# Patient Record
Sex: FEMALE | Race: WHITE | NOT HISPANIC OR LATINO | Employment: STUDENT | URBAN - METROPOLITAN AREA
[De-identification: names, ages, dates, MRNs, and addresses within clinical notes are randomized per-mention and may not be internally consistent; named-entity substitution may affect disease eponyms.]

---

## 2017-08-20 ENCOUNTER — OFFICE VISIT (OUTPATIENT)
Dept: URGENT CARE | Facility: CLINIC | Age: 22
End: 2017-08-20
Payer: COMMERCIAL

## 2017-08-20 VITALS
BODY MASS INDEX: 25.11 KG/M2 | OXYGEN SATURATION: 100 % | HEART RATE: 95 BPM | HEIGHT: 67 IN | WEIGHT: 160 LBS | DIASTOLIC BLOOD PRESSURE: 80 MMHG | TEMPERATURE: 98 F | SYSTOLIC BLOOD PRESSURE: 121 MMHG | RESPIRATION RATE: 18 BRPM

## 2017-08-20 DIAGNOSIS — J02.9 SORE THROAT: Primary | ICD-10-CM

## 2017-08-20 DIAGNOSIS — J02.9 ACUTE PHARYNGITIS, UNSPECIFIED ETIOLOGY: ICD-10-CM

## 2017-08-20 LAB
CTP QC/QA: YES
S PYO RRNA THROAT QL PROBE: NEGATIVE

## 2017-08-20 PROCEDURE — 3008F BODY MASS INDEX DOCD: CPT | Mod: S$GLB,,, | Performed by: EMERGENCY MEDICINE

## 2017-08-20 PROCEDURE — 99214 OFFICE O/P EST MOD 30 MIN: CPT | Mod: 25,S$GLB,, | Performed by: EMERGENCY MEDICINE

## 2017-08-20 PROCEDURE — 87880 STREP A ASSAY W/OPTIC: CPT | Mod: QW,S$GLB,, | Performed by: EMERGENCY MEDICINE

## 2017-08-20 PROCEDURE — 96372 THER/PROPH/DIAG INJ SC/IM: CPT | Mod: S$GLB,,, | Performed by: EMERGENCY MEDICINE

## 2017-08-20 RX ORDER — AZITHROMYCIN 250 MG/1
TABLET, FILM COATED ORAL
Qty: 2 TABLET | Refills: 0 | Status: SHIPPED | OUTPATIENT
Start: 2017-08-20 | End: 2018-08-01 | Stop reason: ALTCHOICE

## 2017-08-20 RX ORDER — BETAMETHASONE SODIUM PHOSPHATE AND BETAMETHASONE ACETATE 3; 3 MG/ML; MG/ML
9 INJECTION, SUSPENSION INTRA-ARTICULAR; INTRALESIONAL; INTRAMUSCULAR; SOFT TISSUE ONCE
Status: COMPLETED | OUTPATIENT
Start: 2017-08-20 | End: 2017-08-20

## 2017-08-20 RX ADMIN — BETAMETHASONE SODIUM PHOSPHATE AND BETAMETHASONE ACETATE 9 MG: 3; 3 INJECTION, SUSPENSION INTRA-ARTICULAR; INTRALESIONAL; INTRAMUSCULAR; SOFT TISSUE at 02:08

## 2017-08-20 NOTE — PATIENT INSTRUCTIONS
Please return here or go to the Emergency Department for any concerns or worsening of condition.     Follow up with Primary Care or ENT if not improved in 7-10 Days:  [phone]      Pharyngitis: Strep (Presumed)     You have pharyngitis (sore throat). The cause is thought to be the streptococcus, or strep, bacterium. Strep throat infection can cause throat pain that is worse when swallowing, aching all over, headache, and fever. The infection may be spread by coughing, kissing, or touching others after touching your mouth or nose. Antibiotic medications are given to treat the infection.  Home care  · Rest at home. Drink plenty of fluids to avoid dehydration.  · No work or school for the first 2 days of taking the antibiotics. After this time, you will not be contagious. You can then return to work or school if you are feeling better.   · The antibiotic medication must be taken for the full 10 days, even if you feel better. This is very important to ensure the infection is treated. It is also important to prevent drug-resistant organisms from developing. If you were given an antibiotic shot, no more antibiotics are needed.  · You may use acetaminophen or ibuprofen to control pain or fever, unless another medicine was prescribed for this. If you have chronic liver or kidney disease or ever had a stomach ulcer or GI bleeding, talk with your doctor before using these medicines.  · Throat lozenges or a throat-numbing sprays can help reduce throat pain. Gargling with warm salt water can also help. Dissolve 1/2 teaspoon of salt in 1 8 ounce glass of warm water.   · Avoid salty or spicy foods, which can irritate the throat.  Follow-up care  Follow up with your healthcare provider or our staff if you are not improving over the next week.  When to seek medical advice  Call your healthcare provider right away if any of these occur:  · Fever as directed by your doctor.   · New or worsening ear pain, sinus pain, or  headache  · Painful lumps in the back of neck  · Stiff neck  · Lymph nodes are getting larger  · Inability to swallow liquids, excessive drooling, or inability to open mouth wide due to throat pain  · Signs of dehydration (very dark urine or no urine, sunken eyes, dizziness)  · Trouble breathing or noisy breathing  · Muffled voice  · New rash  Date Last Reviewed: 4/13/2015  © 9887-6353 [employer]. [address] [place] [address], [place] [address]. All rights reserved. This information is not intended as a substitute for professional medical care. Always follow your healthcare professional's instructions.

## 2018-08-01 ENCOUNTER — LAB VISIT (OUTPATIENT)
Dept: LAB | Facility: HOSPITAL | Age: 23
End: 2018-08-01
Attending: INTERNAL MEDICINE
Payer: COMMERCIAL

## 2018-08-01 ENCOUNTER — OFFICE VISIT (OUTPATIENT)
Dept: INTERNAL MEDICINE | Facility: CLINIC | Age: 23
End: 2018-08-01
Payer: COMMERCIAL

## 2018-08-01 VITALS
RESPIRATION RATE: 18 BRPM | DIASTOLIC BLOOD PRESSURE: 72 MMHG | SYSTOLIC BLOOD PRESSURE: 106 MMHG | BODY MASS INDEX: 25.15 KG/M2 | OXYGEN SATURATION: 99 % | HEIGHT: 67 IN | HEART RATE: 82 BPM | TEMPERATURE: 98 F | WEIGHT: 160.25 LBS

## 2018-08-01 DIAGNOSIS — B00.9 HSV INFECTION: ICD-10-CM

## 2018-08-01 DIAGNOSIS — Z23 NEED FOR HPV VACCINATION: ICD-10-CM

## 2018-08-01 DIAGNOSIS — Z00.00 ANNUAL PHYSICAL EXAM: Primary | ICD-10-CM

## 2018-08-01 DIAGNOSIS — Z23 NEED FOR TD VACCINE: ICD-10-CM

## 2018-08-01 DIAGNOSIS — Z00.00 ANNUAL PHYSICAL EXAM: ICD-10-CM

## 2018-08-01 LAB
ALBUMIN SERPL BCP-MCNC: 4.5 G/DL
ALP SERPL-CCNC: 57 U/L
ALT SERPL W/O P-5'-P-CCNC: 17 U/L
ANION GAP SERPL CALC-SCNC: 9 MMOL/L
AST SERPL-CCNC: 20 U/L
BASOPHILS # BLD AUTO: 0.03 K/UL
BASOPHILS NFR BLD: 0.6 %
BILIRUB SERPL-MCNC: 0.6 MG/DL
BUN SERPL-MCNC: 13 MG/DL
CALCIUM SERPL-MCNC: 9.8 MG/DL
CHLORIDE SERPL-SCNC: 102 MMOL/L
CHOLEST SERPL-MCNC: 165 MG/DL
CHOLEST/HDLC SERPL: 2.9 {RATIO}
CO2 SERPL-SCNC: 25 MMOL/L
CREAT SERPL-MCNC: 0.9 MG/DL
DIFFERENTIAL METHOD: NORMAL
EOSINOPHIL # BLD AUTO: 0.1 K/UL
EOSINOPHIL NFR BLD: 1.8 %
ERYTHROCYTE [DISTWIDTH] IN BLOOD BY AUTOMATED COUNT: 12 %
EST. GFR  (AFRICAN AMERICAN): >60 ML/MIN/1.73 M^2
EST. GFR  (NON AFRICAN AMERICAN): >60 ML/MIN/1.73 M^2
ESTIMATED AVG GLUCOSE: 88 MG/DL
GLUCOSE SERPL-MCNC: 82 MG/DL
HBA1C MFR BLD HPLC: 4.7 %
HCT VFR BLD AUTO: 44.7 %
HDLC SERPL-MCNC: 56 MG/DL
HDLC SERPL: 33.9 %
HGB BLD-MCNC: 14.8 G/DL
IMM GRANULOCYTES # BLD AUTO: 0.01 K/UL
IMM GRANULOCYTES NFR BLD AUTO: 0.2 %
LDLC SERPL CALC-MCNC: 95.4 MG/DL
LYMPHOCYTES # BLD AUTO: 2.2 K/UL
LYMPHOCYTES NFR BLD: 42.6 %
MCH RBC QN AUTO: 30.7 PG
MCHC RBC AUTO-ENTMCNC: 33.1 G/DL
MCV RBC AUTO: 93 FL
MONOCYTES # BLD AUTO: 0.4 K/UL
MONOCYTES NFR BLD: 7.7 %
NEUTROPHILS # BLD AUTO: 2.4 K/UL
NEUTROPHILS NFR BLD: 47.1 %
NONHDLC SERPL-MCNC: 109 MG/DL
NRBC BLD-RTO: 0 /100 WBC
PLATELET # BLD AUTO: 228 K/UL
PMV BLD AUTO: 11.4 FL
POTASSIUM SERPL-SCNC: 3.9 MMOL/L
PROT SERPL-MCNC: 7.8 G/DL
RBC # BLD AUTO: 4.82 M/UL
SODIUM SERPL-SCNC: 136 MMOL/L
TRIGL SERPL-MCNC: 68 MG/DL
TSH SERPL DL<=0.005 MIU/L-ACNC: 1.86 UIU/ML
WBC # BLD AUTO: 5.07 K/UL

## 2018-08-01 PROCEDURE — 80061 LIPID PANEL: CPT

## 2018-08-01 PROCEDURE — 83036 HEMOGLOBIN GLYCOSYLATED A1C: CPT

## 2018-08-01 PROCEDURE — 36415 COLL VENOUS BLD VENIPUNCTURE: CPT | Mod: PO

## 2018-08-01 PROCEDURE — 99385 PREV VISIT NEW AGE 18-39: CPT | Mod: 25,S$GLB,, | Performed by: INTERNAL MEDICINE

## 2018-08-01 PROCEDURE — 80053 COMPREHEN METABOLIC PANEL: CPT

## 2018-08-01 PROCEDURE — 99999 PR PBB SHADOW E&M-EST. PATIENT-LVL IV: CPT | Mod: PBBFAC,,, | Performed by: INTERNAL MEDICINE

## 2018-08-01 PROCEDURE — 90714 TD VACC NO PRESV 7 YRS+ IM: CPT | Mod: S$GLB,,, | Performed by: INTERNAL MEDICINE

## 2018-08-01 PROCEDURE — 85025 COMPLETE CBC W/AUTO DIFF WBC: CPT

## 2018-08-01 PROCEDURE — 90471 IMMUNIZATION ADMIN: CPT | Mod: S$GLB,,, | Performed by: INTERNAL MEDICINE

## 2018-08-01 PROCEDURE — 84443 ASSAY THYROID STIM HORMONE: CPT

## 2018-08-01 RX ORDER — VALACYCLOVIR HYDROCHLORIDE 500 MG/1
500 TABLET, FILM COATED ORAL 2 TIMES DAILY
Qty: 60 TABLET | Refills: 2 | Status: SHIPPED | OUTPATIENT
Start: 2018-08-01 | End: 2018-11-16 | Stop reason: SDUPTHER

## 2018-08-01 RX ORDER — VALACYCLOVIR HYDROCHLORIDE 1 G/1
1000 TABLET, FILM COATED ORAL 2 TIMES DAILY
COMMUNITY
End: 2018-08-01 | Stop reason: SDUPTHER

## 2018-08-01 NOTE — PROGRESS NOTES
Subjective:       Patient ID: Iqra Carrillo is a 23 y.o. female.    Chief Complaint: Establish Care    HPI    23 y.o. female here for annual exam.     Health Maintenance:   Lipid disorders/ASCVD risk: due    DM: A1c due  Sexual/ STD Screening: no concerns  Eye exam: no issues  Cervical Cancer (21-65y):  One year ago    Vaccines:   Influenza (yearly)    Tetanus (every 10 yrs - 1st tdap) 2008 - due    HPV (19-26 F; 19-21 M) 1/3 in 2009       Exercise: yes 2-3x/wk - walking and jogging  Diet: normal     Medical Problems:  1. ADHD: takes vyvanse 20mg daily. Follows w/ Psychiatry  2. HSV 1 and 2: she has prescription of valtrex from gynecology.       Past Medical History:   Diagnosis Date    ADHD (attention deficit hyperactivity disorder)      Past Surgical History:   Procedure Laterality Date    BREAST SURGERY       Family History   Problem Relation Age of Onset    Cancer Maternal Grandfather         kidney    No Known Problems Mother     No Known Problems Father     No Known Problems Sister     No Known Problems Brother     Asthma Neg Hx     Birth defects Neg Hx     Chromosomal disorder Neg Hx     Diabetes Neg Hx     Early death Neg Hx     Heart disease Neg Hx     Hyperlipidemia Neg Hx     Hypertension Neg Hx     Mental illness Neg Hx     Seizures Neg Hx     Thyroid disease Neg Hx     Other Neg Hx     Melanoma Neg Hx     Psoriasis Neg Hx     Lupus Neg Hx      Social History     Social History    Marital status: Single     Spouse name: N/A    Number of children: N/A    Years of education: N/A     Occupational History    Not on file.     Social History Main Topics    Smoking status: Never Smoker    Smokeless tobacco: Never Used    Alcohol use Yes    Drug use: No    Sexual activity: No     Other Topics Concern    Are You Pregnant Or Think You May Be? 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 for 5 days, Disp: 60 tablet, Rfl: 2    VYVANSE 20 mg capsule, Take 20 mg by mouth every morning., Disp: , Rfl: 0      Review of Systems   Constitutional: Negative for fatigue and unexpected weight change.   HENT: Negative for dental problem, sinus pain and trouble swallowing.    Eyes: Negative for discharge and redness.   Respiratory: Negative for cough and shortness of breath.    Cardiovascular: Negative for chest pain and leg swelling.   Gastrointestinal: Negative for abdominal pain, blood in stool, constipation and diarrhea.   Genitourinary: Negative for difficulty urinating, dysuria and frequency.   Musculoskeletal: Negative for gait problem and joint swelling.   Skin: Negative for pallor, rash and wound.   Neurological: Negative for numbness and headaches.       Objective:        Vitals:    08/01/18 0809   BP: 106/72   Pulse: 82   Resp: 18   Temp: 98.3 °F (36.8 °C)   SpO2: 99%   Weight: 72.7 kg (160 lb 4.4 oz)   Height: 5' 7" (1.702 m)       Body mass index is 25.1 kg/m².    Physical Exam   Constitutional: She is oriented to person, place, and time. She appears well-developed. No distress.   HENT:   Head: Normocephalic and atraumatic.   Right Ear: Tympanic membrane normal.   Left Ear: Tympanic membrane normal.   Nose: Nose normal.   Mouth/Throat: Oropharynx is clear and moist.   Eyes: Conjunctivae and EOM are normal. Pupils are equal, round, and reactive to light.   Neck: Normal range of motion. Neck supple. No tracheal deviation present. No thyromegaly present.   Cardiovascular: Normal rate, regular rhythm, normal heart sounds and intact distal pulses.    Pulmonary/Chest: Effort normal and breath sounds normal.   Abdominal: Soft. " Bowel sounds are normal. She exhibits no distension and no mass. There is no tenderness.   Musculoskeletal: Normal range of motion. She exhibits no edema.   Lymphadenopathy:     She has no cervical adenopathy.   Neurological: She is alert and oriented to person, place, and time. No sensory deficit.   Skin: Skin is warm and dry. She is not diaphoretic. No cyanosis. Nails show no clubbing.   Psychiatric: She has a normal mood and affect. Her behavior is normal. Judgment normal.       Assessment:     1. Annual physical exam    2. Need for Td vaccine    3. Need for HPV vaccination    4. HSV infection           Plan:         1. Annual physical exam  - CBC auto differential; Future  - Comprehensive metabolic panel; Future  - Hemoglobin A1c; Future  - Lipid panel; Future  - TSH; Future  - Urinalysis; Future    2. Need for Td vaccine  - (In Office Administered) Td Vaccine - Preservative Free    3. Need for HPV vaccination  - (In Office Administered) HPV Vaccine (9-Valent) (3 Dose) (IM)  - (In Office Administered) HPV Vaccine (9-Valent) (3 Dose) (IM)  - Ambulatory referral to Infectious Disease    4. HSV infection  - valACYclovir (VALTREX) 500 MG tablet; Take 1 tablet (500 mg total) by mouth 2 (two) times daily. for 5 days  Dispense: 60 tablet; Refill: 2

## 2018-08-02 ENCOUNTER — PATIENT MESSAGE (OUTPATIENT)
Dept: INTERNAL MEDICINE | Facility: CLINIC | Age: 23
End: 2018-08-02

## 2018-10-01 ENCOUNTER — OFFICE VISIT (OUTPATIENT)
Dept: URGENT CARE | Facility: CLINIC | Age: 23
End: 2018-10-01
Payer: COMMERCIAL

## 2018-10-01 VITALS
HEIGHT: 67 IN | TEMPERATURE: 98 F | WEIGHT: 160 LBS | SYSTOLIC BLOOD PRESSURE: 120 MMHG | OXYGEN SATURATION: 97 % | HEART RATE: 84 BPM | RESPIRATION RATE: 16 BRPM | DIASTOLIC BLOOD PRESSURE: 84 MMHG | BODY MASS INDEX: 25.11 KG/M2

## 2018-10-01 DIAGNOSIS — L30.9 DERMATITIS: Primary | ICD-10-CM

## 2018-10-01 PROCEDURE — 99214 OFFICE O/P EST MOD 30 MIN: CPT | Mod: S$GLB,,, | Performed by: PHYSICIAN ASSISTANT

## 2018-10-01 PROCEDURE — 3008F BODY MASS INDEX DOCD: CPT | Mod: CPTII,S$GLB,, | Performed by: PHYSICIAN ASSISTANT

## 2018-10-01 RX ORDER — TRIAMCINOLONE ACETONIDE 1 MG/G
CREAM TOPICAL 2 TIMES DAILY
Qty: 80 G | Refills: 0 | Status: SHIPPED | OUTPATIENT
Start: 2018-10-01 | End: 2018-10-11

## 2018-10-01 RX ORDER — VALACYCLOVIR HYDROCHLORIDE 500 MG/1
TABLET, FILM COATED ORAL
Refills: 2 | COMMUNITY
Start: 2018-09-02 | End: 2018-11-16 | Stop reason: SDUPTHER

## 2018-10-01 NOTE — PROGRESS NOTES
"Subjective:       Patient ID: Iqra Carrillo is a 23 y.o. female.    Vitals:    10/01/18 1435   BP: 120/84   Pulse: 84   Resp: 16   Temp: 97.9 °F (36.6 °C)   TempSrc: Oral   SpO2: 97%   Weight: 72.6 kg (160 lb)   Height: 5' 7" (1.702 m)       Chief Complaint: Rash    Pt has a rash on her right leg that she noticed a week ago that spread, went away, and now is back and spreading again.      Rash   This is a recurrent problem. The current episode started in the past 7 days. The problem has been waxing and waning since onset. The affected locations include the right lower leg. The rash is characterized by itchiness, redness and scaling. Associated with: a hotel room. Pertinent negatives include no cough, diarrhea, fatigue, fever, joint pain, nail changes, shortness of breath or sore throat. Past treatments include anti-itch cream. The treatment provided no relief. There is no history of allergies, asthma, eczema or varicella.     Review of Systems   Constitution: Negative for chills, fatigue and fever.   HENT: Negative for sore throat.    Respiratory: Negative for cough and shortness of breath.    Skin: Positive for color change, itching and rash. Negative for nail changes, suspicious lesions and unusual hair distribution.   Musculoskeletal: Negative for joint pain.   Gastrointestinal: Negative for diarrhea.       Objective:      Physical Exam   Constitutional: She is oriented to person, place, and time. She appears well-developed and well-nourished. She is cooperative.  Non-toxic appearance. She does not appear ill. No distress.   HENT:   Head: Normocephalic and atraumatic. Head is without abrasion, without contusion and without laceration.   Right Ear: Hearing, tympanic membrane, external ear and ear canal normal.   Left Ear: Hearing, tympanic membrane, external ear and ear canal normal.   Nose: Nose normal. No mucosal edema, rhinorrhea or nasal deformity. No epistaxis. Right sinus exhibits no maxillary sinus " tenderness and no frontal sinus tenderness. Left sinus exhibits no maxillary sinus tenderness and no frontal sinus tenderness.   Mouth/Throat: Uvula is midline, oropharynx is clear and moist and mucous membranes are normal. No trismus in the jaw. Normal dentition. No uvula swelling. No posterior oropharyngeal erythema.   Eyes: Conjunctivae, EOM and lids are normal. Pupils are equal, round, and reactive to light. No scleral icterus.   Sclera clear bilat   Neck: Trachea normal, full passive range of motion without pain and phonation normal. Neck supple.   Cardiovascular: Normal rate, regular rhythm, normal heart sounds, intact distal pulses and normal pulses.   Pulmonary/Chest: Effort normal and breath sounds normal. No stridor. No respiratory distress.   Abdominal: Soft. Normal appearance and bowel sounds are normal. She exhibits no distension. There is no tenderness.   Musculoskeletal: Normal range of motion. She exhibits no edema or deformity.   Neurological: She is alert and oriented to person, place, and time. She exhibits normal muscle tone. Coordination normal.   Skin: Skin is warm, dry and intact. Capillary refill takes less than 2 seconds. Rash noted. No abrasion, no bruising, no burn, no ecchymosis, no laceration, no lesion, no petechiae and no purpura noted. Rash is macular. Rash is not nodular, not pustular, not vesicular and not urticarial. She is not diaphoretic. No erythema. No pallor.        Nonspecific pinpoint erythematous macular rash noted without E/E/E or warmth   Psychiatric: She has a normal mood and affect. Her speech is normal and behavior is normal. Judgment and thought content normal. Cognition and memory are normal.   Nursing note and vitals reviewed.      Assessment:       1. Dermatitis        Plan:       Iqra was seen today for rash.    Diagnoses and all orders for this visit:    Dermatitis  -     triamcinolone acetonide 0.1% (KENALOG) 0.1 % cream; Apply topically 2 (two) times daily.  for 10 days      Nonspecific Dermatitis  Dermatitis is a skin rash caused by something that touches the skin and makes it irritated and inflamed.  Your skin may be red, swollen, dry, and may be cracked. Blisters may form and ooze. The rash will itch.  Dermatitis can form on the face and neck, backs of hands, forearms, genitals, and lower legs. Dermatitis is not passed from person to person.  Talk with your health care provider about what may have caused the rash. Common things that cause skin allergies are metal in jewelry, plants like poison ivy or poison oak, and certain skin care products. You will need to avoid the source of your rash in the future to prevent it from coming back. In some cases, the cause of the dermatitis may not be found.  Treatment is done to relieve itching and prevent the rash from coming back. The rash should go away in a few days to a few weeks.  Home care  The health care provider may prescribe medications to relieve swelling and itching. Follow all instructions when using these medications.  · Avoid anything that heats up your skin, such as hot showers or baths, or direct sunlight. This can make itching worse.  · Stay away from whatever you think caused the rash.  · Bathe in warm, not hot, water. Apply a moisturizing lotion after bathing to prevent dry skin.  · Avoid skin irritants such as wool or silk clothing, grease, oils, harsh soaps, and detergents.  · Apply cold compresses to soothe your sores to help relieve your symptoms. Do this for 30 minutes 3 to 4 times a day. You can make a cold compress by soaking a cloth in cold water. Squeeze out excess water. You can add colloidal oatmeal to the water to help reduce itching. For severe itching in a small area, apply an ice pack wrapped in a thin towel. Do this for 20 minutes 3 to 4 times a day.  · You can also help relieve large areas of itching by taking a lukewarm bath with colloidal oatmeal added to the water.  · Use hydrocortisone  cream for redness and irritation, unless another medicine was prescribed. You can also use benzocaine anesthetic cream or spray.  · Use oral diphenhydramine to help reduce itching. This is an antihistamine you can buy at drug and grocery stores. It can make you sleepy, so use lower doses during the daytime. Or you can use loratadine. This is an antihistamine that will not make you sleepy. Dont use diphenhydramine if you have glaucoma or have trouble urinating because of an enlarged prostate.  · Wash your hands or use an antibacterial gel often to prevent the spread of the rash.  Follow-up care  Follow up with your health care provider. Make an appointment with your health care provider if your symptoms do not get better in the next 1 to 2 weeks.  When to seek medical advice  Call your health care provider right away if any of these occur:  · Spreading of the rash to other parts of your body  · Severe swelling of your face, eyelids, mouth, throat or tongue  · Trouble urinating due to swelling in the genital area  · Fever of 100.4°F (38°C) or higher  · Redness or swelling that gets worse  · Pain that gets worse  · Foul-smelling fluid leaking from the skin  · Yellow-brown crusts on the open blisters  · Joint pain   Date Last Reviewed: 7/23/2014  © 1456-1133 [employer]. [address] [place] [address], [place] [id number]. All rights reserved. This information is not intended as a substitute for professional medical care. Always follow your healthcare professional's instructions.      Please follow up with your Primary care provider within 2-5 days if your signs and symptoms have not resolved or worsen.     If your condition worsens or fails to improve we recommend that you receive another evaluation at the emergency room immediately or contact your primary medical clinic to discuss your concerns.   You must understand that you have received an Urgent Care treatment only and that you may be released before all of  your medical problems are known or treated. You, the patient, will arrange for follow up care as instructed.     RED FLAGS/WARNING SYMPTOMS DISCUSSED WITH PATIENT THAT WOULD WARRANT EMERGENT MEDICAL ATTENTION. PATIENT VERBALIZED UNDERSTANDING.

## 2018-10-01 NOTE — PATIENT INSTRUCTIONS
Nonspecific Dermatitis  Dermatitis is a skin rash caused by something that touches the skin and makes it irritated and inflamed.  Your skin may be red, swollen, dry, and may be cracked. Blisters may form and ooze. The rash will itch.  Dermatitis can form on the face and neck, backs of hands, forearms, genitals, and lower legs. Dermatitis is not passed from person to person.  Talk with your health care provider about what may have caused the rash. Common things that cause skin allergies are metal in jewelry, plants like poison ivy or poison oak, and certain skin care products. You will need to avoid the source of your rash in the future to prevent it from coming back. In some cases, the cause of the dermatitis may not be found.  Treatment is done to relieve itching and prevent the rash from coming back. The rash should go away in a few days to a few weeks.  Home care  The health care provider may prescribe medications to relieve swelling and itching. Follow all instructions when using these medications.  · Avoid anything that heats up your skin, such as hot showers or baths, or direct sunlight. This can make itching worse.  · Stay away from whatever you think caused the rash.  · Bathe in warm, not hot, water. Apply a moisturizing lotion after bathing to prevent dry skin.  · Avoid skin irritants such as wool or silk clothing, grease, oils, harsh soaps, and detergents.  · Apply cold compresses to soothe your sores to help relieve your symptoms. Do this for 30 minutes 3 to 4 times a day. You can make a cold compress by soaking a cloth in cold water. Squeeze out excess water. You can add colloidal oatmeal to the water to help reduce itching. For severe itching in a small area, apply an ice pack wrapped in a thin towel. Do this for 20 minutes 3 to 4 times a day.  · You can also help relieve large areas of itching by taking a lukewarm bath with colloidal oatmeal added to the water.  · Use hydrocortisone cream for redness  and irritation, unless another medicine was prescribed. You can also use benzocaine anesthetic cream or spray.  · Use oral diphenhydramine to help reduce itching. This is an antihistamine you can buy at drug and grocery stores. It can make you sleepy, so use lower doses during the daytime. Or you can use loratadine. This is an antihistamine that will not make you sleepy. Dont use diphenhydramine if you have glaucoma or have trouble urinating because of an enlarged prostate.  · Wash your hands or use an antibacterial gel often to prevent the spread of the rash.  Follow-up care  Follow up with your health care provider. Make an appointment with your health care provider if your symptoms do not get better in the next 1 to 2 weeks.  When to seek medical advice  Call your health care provider right away if any of these occur:  · Spreading of the rash to other parts of your body  · Severe swelling of your face, eyelids, mouth, throat or tongue  · Trouble urinating due to swelling in the genital area  · Fever of 100.4°F (38°C) or higher  · Redness or swelling that gets worse  · Pain that gets worse  · Foul-smelling fluid leaking from the skin  · Yellow-brown crusts on the open blisters  · Joint pain   Date Last Reviewed: 7/23/2014  © 4119-6890 [employer]. [address] [place] [address], [place] [address]. All rights reserved. This information is not intended as a substitute for professional medical care. Always follow your healthcare professional's instructions.      Please follow up with your Primary care provider within 2-5 days if your signs and symptoms have not resolved or worsen.     If your condition worsens or fails to improve we recommend that you receive another evaluation at the emergency room immediately or contact your primary medical clinic to discuss your concerns.   You must understand that you have received an Urgent Care treatment only and that you may be released before all of your medical  problems are known or treated. You, the patient, will arrange for follow up care as instructed.     RED FLAGS/WARNING SYMPTOMS DISCUSSED WITH PATIENT THAT WOULD WARRANT EMERGENT MEDICAL ATTENTION. PATIENT VERBALIZED UNDERSTANDING.

## 2018-11-16 DIAGNOSIS — B00.9 HSV INFECTION: ICD-10-CM

## 2018-11-16 RX ORDER — VALACYCLOVIR HYDROCHLORIDE 500 MG/1
500 TABLET, FILM COATED ORAL 2 TIMES DAILY
Qty: 60 TABLET | Refills: 2 | Status: SHIPPED | OUTPATIENT
Start: 2018-11-16 | End: 2023-06-05 | Stop reason: SDUPTHER

## 2018-12-27 ENCOUNTER — OFFICE VISIT (OUTPATIENT)
Dept: URGENT CARE | Facility: CLINIC | Age: 23
End: 2018-12-27
Payer: COMMERCIAL

## 2018-12-27 VITALS
DIASTOLIC BLOOD PRESSURE: 82 MMHG | HEART RATE: 92 BPM | WEIGHT: 145 LBS | BODY MASS INDEX: 22.76 KG/M2 | SYSTOLIC BLOOD PRESSURE: 120 MMHG | HEIGHT: 67 IN | OXYGEN SATURATION: 99 % | RESPIRATION RATE: 18 BRPM | TEMPERATURE: 97 F

## 2018-12-27 DIAGNOSIS — J06.9 UPPER RESPIRATORY TRACT INFECTION, UNSPECIFIED TYPE: Primary | ICD-10-CM

## 2018-12-27 DIAGNOSIS — J02.9 SORE THROAT: ICD-10-CM

## 2018-12-27 LAB
CTP QC/QA: YES
S PYO RRNA THROAT QL PROBE: NEGATIVE

## 2018-12-27 PROCEDURE — 87880 STREP A ASSAY W/OPTIC: CPT | Mod: QW,S$GLB,, | Performed by: PHYSICIAN ASSISTANT

## 2018-12-27 PROCEDURE — 96372 THER/PROPH/DIAG INJ SC/IM: CPT | Mod: S$GLB,,, | Performed by: PHYSICIAN ASSISTANT

## 2018-12-27 PROCEDURE — 3008F BODY MASS INDEX DOCD: CPT | Mod: CPTII,S$GLB,, | Performed by: PHYSICIAN ASSISTANT

## 2018-12-27 PROCEDURE — 99214 OFFICE O/P EST MOD 30 MIN: CPT | Mod: 25,S$GLB,, | Performed by: PHYSICIAN ASSISTANT

## 2018-12-27 RX ORDER — BETAMETHASONE SODIUM PHOSPHATE AND BETAMETHASONE ACETATE 3; 3 MG/ML; MG/ML
6 INJECTION, SUSPENSION INTRA-ARTICULAR; INTRALESIONAL; INTRAMUSCULAR; SOFT TISSUE
Status: COMPLETED | OUTPATIENT
Start: 2018-12-27 | End: 2018-12-27

## 2018-12-27 RX ADMIN — BETAMETHASONE SODIUM PHOSPHATE AND BETAMETHASONE ACETATE 6 MG: 3; 3 INJECTION, SUSPENSION INTRA-ARTICULAR; INTRALESIONAL; INTRAMUSCULAR; SOFT TISSUE at 03:12

## 2018-12-27 NOTE — PATIENT INSTRUCTIONS
If you were prescribed a narcotic or controlled medication, do not drive or operate heavy equipment or machinery while taking these medications.  You must understand that you've received an Urgent Care treatment only and that you may be released before all your medical problems are known or treated. You, the patient, will arrange for follow up care as instructed.  Follow up with your PCP or specialty clinic as directed in the next 1-2 weeks if not improved or as needed.  You can call [phone] to schedule an appointment with the appropriate provider.  If your condition worsens we recommend that you receive another evaluation at the emergency room immediately or contact your primary medical clinics after hours call service to discuss your concerns.  Please return here or go to the Emergency Department for any concerns or worsening of condition.    Use an antihistamine such as Claritin, Zyrtec or Allegra to dry you out.     Use pseudoephedrine (behind the counter) to decongest. Pseudoephedrine  30 mg up to 240 mg /day. It can raise your blood pressure and give you palpitations.    Use mucinex (guaifenisin) to break up mucous up to 2400mg/day to loosen any mucous. The mucinex DM pill has a cough suppressant that can be used at night to stop the tickle at the back of your throat.    Use Nasal Saline to mechanically move any post nasal drip from your eustachian tube or from the back of your throat.    Use Afrin in each nare for no longer than 3 days, as it is addictive. It can also dry out your mucous membranes and cause elevated blood pressure.    Use Flonase 1-2 sprays/nostril per day. It is a local acting steroid nasal spray, if you develop a bloody nose, stop using the medication immediately.    Use warm salt water gargles to ease your throat pain. Warm salt water gargles as needed for sore throat-  1/2 tsp salt to 1 cup warm water, gargle as desired.    Sometimes Nyquil at night is beneficial to help you get  some rest, however it is sedating and it does have an antihistamine, and tylenol.    You received a steroid shot today - this can elevate your blood pressure, elevate your blood sugar, water weight gain, nervous energy, redness to the face and dimpling of the skin where the shot goes in.     Self-Care for Sore Throats    Sore throats happen for many reasons, such as colds, allergies, and infections caused by viruses or bacteria. In any case, your throat becomes red and sore. Your goal for self-care is to reduce your discomfort while giving your throat a chance to heal.  Moisten and soothe your throat  Tips include the following:  · Try a sip of water first thing after waking up.  · Keep your throat moist by drinking 6 or more glasses of clear liquids every day.  · Run a cool-air humidifier in your room overnight.  · Avoid cigarette smoke.   · Suck on throat lozenges, cough drops, hard candy, ice chips, or frozen fruit-juice bars. Use the sugar-free versions if your diet or medical condition requires them.  Gargle to ease irritation  Gargling every hour or 2 can ease irritation. Try gargling with 1 of these solutions:  · 1/4 teaspoon of salt in 1/2 cup of warm water  · An over-the-counter anesthetic gargle  Use medicine for more relief  Over-the-counter medicine can reduce sore throat symptoms. Ask your pharmacist if you have questions about which medicine to use:  · Ease pain with anesthetic sprays. Aspirin or an aspirin substitute also helps. Remember, never give aspirin to anyone 18 or younger, or if you are already taking blood thinners.   · For sore throats caused by allergies, try antihistamines to block the allergic reaction.  · Remember: unless a sore throat is caused by a bacterial infection, antibiotics wont help you.  Prevent future sore throats  Prevention tips include the following:  · Stop smoking or reduce contact with secondhand smoke. Smoke irritates the tender throat lining.  · Limit contact with  pets and with allergy-causing substances, such as pollen and mold.  · When youre around someone with a sore throat or cold, wash your hands often to keep viruses or bacteria from spreading.  · Dont strain your vocal cords.  Call your healthcare provider  Contact your healthcare provider if you have:  · A temperature over 101°F (38.3°C)  · White spots on the throat  · Great difficulty swallowing  · Trouble breathing  · A skin rash  · Recent exposure to someone else with strep bacteria  · Severe hoarseness and swollen glands in the neck or jaw   Date Last Reviewed: 8/1/2016  © 1099-2006 [employer]. [address] [place] [id number]. All rights reserved. This information is not intended as a substitute for professional medical care. Always follow your healthcare professional's instructions.          Viral Upper Respiratory Illness (Adult)  You have a viral upper respiratory illness (URI), which is another term for the common cold. This illness is contagious during the first few days. It is spread through the air by coughing and sneezing. It may also be spread by direct contact (touching the sick person and then touching your own eyes, nose, or mouth). Frequent handwashing will decrease risk of spread. Most viral illnesses go away within 7 to 10 days with rest and simple home remedies. Sometimes the illness may last for several weeks. Antibiotics will not kill a virus, and they are generally not prescribed for this condition.    Home care  · If symptoms are severe, rest at home for the first 2 to 3 days. When you resume activity, don't let yourself get too tired.  · Avoid being exposed to cigarette smoke (yours or others).  · You may use acetaminophen or ibuprofen to control pain and fever, unless another medicine was prescribed. (Note: If you have chronic liver or kidney disease, have ever had a stomach ulcer or gastrointestinal bleeding, or are taking blood-thinning medicines, talk with your  healthcare provider before using these medicines.) Aspirin should never be given to anyone under 18 years of age who is ill with a viral infection or fever. It may cause severe liver or brain damage.  · Your appetite may be poor, so a light diet is fine. Avoid dehydration by drinking 6 to 8 glasses of fluids per day (water, soft drinks, juices, tea, or soup). Extra fluids will help loosen secretions in the nose and lungs.  · Over-the-counter cold medicines will not shorten the length of time youre sick, but they may be helpful for the following symptoms: cough, sore throat, and nasal and sinus congestion. (Note: Do not use decongestants if you have high blood pressure.)  Follow-up care  Follow up with your healthcare provider, or as advised.  When to seek medical advice  Call your healthcare provider right away if any of these occur:  · Cough with lots of colored sputum (mucus)  · Severe headache; face, neck, or ear pain  · Difficulty swallowing due to throat pain  · Fever of 100.4°F (38°C)  Call 911, or get immediate medical care  Call emergency services right away if any of these occur:  · Chest pain, shortness of breath, wheezing, or difficulty breathing  · Coughing up blood  · Inability to swallow due to throat pain  Date Last Reviewed: 9/13/2015  © 0415-3755 [employer] [address] [place] [address], [place] [address]. All rights reserved. This information is not intended as a substitute for professional medical care. Always follow your healthcare professional's instructions.

## 2018-12-27 NOTE — PROGRESS NOTES
"Subjective:       Patient ID: Iqra Carrillo is a 23 y.o. female.    Vitals:  height is 5' 7" (1.702 m) and weight is 65.8 kg (145 lb). Her oral temperature is 97.4 °F (36.3 °C). Her blood pressure is 120/82 and her pulse is 92. Her respiration is 18 and oxygen saturation is 99%.     Chief Complaint: Sore Throat    Last night sore throat, today headaches and throat hurts every time she swallows and her ears are popping. Patient states that throat hurts whenever she swallows and throat pain has gotten worse over the past few days.Patient denies sick contacts, body aches, shortness of breath, abdominal pain, nausea, vomiting and cough. Patient also states that she has not taken anything OTC prior to arrival for symptoms.       Sore Throat    This is a new problem. The current episode started yesterday. The problem has been gradually worsening. The pain is worse on the right side. There has been no fever. The pain is at a severity of 3/10. The pain is mild. Associated symptoms include ear pain, headaches, neck pain, swollen glands and trouble swallowing. Pertinent negatives include no congestion, coughing, shortness of breath, stridor or vomiting. She has tried nothing for the symptoms.       Constitution: Negative for chills, sweating, fatigue and fever.   HENT: Positive for ear pain, sore throat and trouble swallowing. Negative for congestion, sinus pain, sinus pressure and voice change.    Neck: Positive for neck pain. Negative for painful lymph nodes.   Eyes: Negative for eye redness.   Respiratory: Negative for chest tightness, cough, sputum production, bloody sputum, COPD, shortness of breath, stridor, wheezing and asthma.    Gastrointestinal: Negative for nausea and vomiting.   Musculoskeletal: Negative for muscle ache.   Skin: Negative for rash.   Allergic/Immunologic: Negative for seasonal allergies and asthma.   Neurological: Positive for headaches.   Hematologic/Lymphatic: Negative for swollen lymph " nodes.       Objective:      Physical Exam   Constitutional: She is oriented to person, place, and time. She appears well-developed and well-nourished. She is cooperative.  Non-toxic appearance. She does not appear ill. No distress.   HENT:   Head: Normocephalic and atraumatic.   Right Ear: Hearing, tympanic membrane, external ear and ear canal normal.   Left Ear: Hearing, tympanic membrane, external ear and ear canal normal.   Nose: Nose normal. No mucosal edema, rhinorrhea or nasal deformity. No epistaxis. Right sinus exhibits no maxillary sinus tenderness and no frontal sinus tenderness. Left sinus exhibits no maxillary sinus tenderness and no frontal sinus tenderness.   Mouth/Throat: Uvula is midline and mucous membranes are normal. No trismus in the jaw. Normal dentition. Uvula swelling present. Posterior oropharyngeal erythema present.   Eyes: Conjunctivae and lids are normal. No scleral icterus.   Sclera clear bilat   Neck: Trachea normal, full passive range of motion without pain and phonation normal. Neck supple.   Cardiovascular: Normal rate, regular rhythm, normal heart sounds, intact distal pulses and normal pulses.   Pulmonary/Chest: Effort normal and breath sounds normal. No respiratory distress.   Abdominal: Soft. Normal appearance and bowel sounds are normal. She exhibits no distension. There is no tenderness.   Musculoskeletal: Normal range of motion. She exhibits no edema or deformity.   Lymphadenopathy:     She has cervical adenopathy.   Neurological: She is alert and oriented to person, place, and time. She exhibits normal muscle tone. Coordination normal.   Skin: Skin is warm, dry and intact. She is not diaphoretic. No pallor.   Psychiatric: She has a normal mood and affect. Her speech is normal and behavior is normal. Judgment and thought content normal. Cognition and memory are normal.   Nursing note and vitals reviewed.      Assessment:       1. Upper respiratory tract infection, unspecified  type    2. Sore throat        Plan:         Upper respiratory tract infection, unspecified type  -     betamethasone acetate-betamethasone sodium phosphate injection 6 mg    Sore throat  -     POCT rapid strep A  -     betamethasone acetate-betamethasone sodium phosphate injection 6 mg      Results for orders placed or performed in visit on 12/27/18   POCT rapid strep A   Result Value Ref Range    Rapid Strep A Screen Negative Negative     Acceptable Yes        Patient Instructions     If you were prescribed a narcotic or controlled medication, do not drive or operate heavy equipment or machinery while taking these medications.  You must understand that you've received an Urgent Care treatment only and that you may be released before all your medical problems are known or treated. You, the patient, will arrange for follow up care as instructed.  Follow up with your PCP or specialty clinic as directed in the next 1-2 weeks if not improved or as needed.  You can call [phone] to schedule an appointment with the appropriate provider.  If your condition worsens we recommend that you receive another evaluation at the emergency room immediately or contact your primary medical clinics after hours call service to discuss your concerns.  Please return here or go to the Emergency Department for any concerns or worsening of condition.    Use an antihistamine such as Claritin, Zyrtec or Allegra to dry you out.     Use pseudoephedrine (behind the counter) to decongest. Pseudoephedrine  30 mg up to 240 mg /day. It can raise your blood pressure and give you palpitations.    Use mucinex (guaifenisin) to break up mucous up to 2400mg/day to loosen any mucous. The mucinex DM pill has a cough suppressant that can be used at night to stop the tickle at the back of your throat.    Use Nasal Saline to mechanically move any post nasal drip from your eustachian tube or from the back of your throat.    Use Afrin in each  nare for no longer than 3 days, as it is addictive. It can also dry out your mucous membranes and cause elevated blood pressure.    Use Flonase 1-2 sprays/nostril per day. It is a local acting steroid nasal spray, if you develop a bloody nose, stop using the medication immediately.    Use warm salt water gargles to ease your throat pain. Warm salt water gargles as needed for sore throat-  1/2 tsp salt to 1 cup warm water, gargle as desired.    Sometimes Nyquil at night is beneficial to help you get some rest, however it is sedating and it does have an antihistamine, and tylenol.    You received a steroid shot today - this can elevate your blood pressure, elevate your blood sugar, water weight gain, nervous energy, redness to the face and dimpling of the skin where the shot goes in.     Self-Care for Sore Throats    Sore throats happen for many reasons, such as colds, allergies, and infections caused by viruses or bacteria. In any case, your throat becomes red and sore. Your goal for self-care is to reduce your discomfort while giving your throat a chance to heal.  Moisten and soothe your throat  Tips include the following:  · Try a sip of water first thing after waking up.  · Keep your throat moist by drinking 6 or more glasses of clear liquids every day.  · Run a cool-air humidifier in your room overnight.  · Avoid cigarette smoke.   · Suck on throat lozenges, cough drops, hard candy, ice chips, or frozen fruit-juice bars. Use the sugar-free versions if your diet or medical condition requires them.  Gargle to ease irritation  Gargling every hour or 2 can ease irritation. Try gargling with 1 of these solutions:  · 1/4 teaspoon of salt in 1/2 cup of warm water  · An over-the-counter anesthetic gargle  Use medicine for more relief  Over-the-counter medicine can reduce sore throat symptoms. Ask your pharmacist if you have questions about which medicine to use:  · Ease pain with anesthetic sprays. Aspirin or an aspirin  substitute also helps. Remember, never give aspirin to anyone 18 or younger, or if you are already taking blood thinners.   · For sore throats caused by allergies, try antihistamines to block the allergic reaction.  · Remember: unless a sore throat is caused by a bacterial infection, antibiotics wont help you.  Prevent future sore throats  Prevention tips include the following:  · Stop smoking or reduce contact with secondhand smoke. Smoke irritates the tender throat lining.  · Limit contact with pets and with allergy-causing substances, such as pollen and mold.  · When youre around someone with a sore throat or cold, wash your hands often to keep viruses or bacteria from spreading.  · Dont strain your vocal cords.  Call your healthcare provider  Contact your healthcare provider if you have:  · A temperature over 101°F (38.3°C)  · White spots on the throat  · Great difficulty swallowing  · Trouble breathing  · A skin rash  · Recent exposure to someone else with strep bacteria  · Severe hoarseness and swollen glands in the neck or jaw   Date Last Reviewed: 8/1/2016  © 1309-0707 [employer]. [address] [place] [address]. All rights reserved. This information is not intended as a substitute for professional medical care. Always follow your healthcare professional's instructions.          Viral Upper Respiratory Illness (Adult)  You have a viral upper respiratory illness (URI), which is another term for the common cold. This illness is contagious during the first few days. It is spread through the air by coughing and sneezing. It may also be spread by direct contact (touching the sick person and then touching your own eyes, nose, or mouth). Frequent handwashing will decrease risk of spread. Most viral illnesses go away within 7 to 10 days with rest and simple home remedies. Sometimes the illness may last for several weeks. Antibiotics will not kill a virus, and they are generally not  prescribed for this condition.    Home care  · If symptoms are severe, rest at home for the first 2 to 3 days. When you resume activity, don't let yourself get too tired.  · Avoid being exposed to cigarette smoke (yours or others).  · You may use acetaminophen or ibuprofen to control pain and fever, unless another medicine was prescribed. (Note: If you have chronic liver or kidney disease, have ever had a stomach ulcer or gastrointestinal bleeding, or are taking blood-thinning medicines, talk with your healthcare provider before using these medicines.) Aspirin should never be given to anyone under 18 years of age who is ill with a viral infection or fever. It may cause severe liver or brain damage.  · Your appetite may be poor, so a light diet is fine. Avoid dehydration by drinking 6 to 8 glasses of fluids per day (water, soft drinks, juices, tea, or soup). Extra fluids will help loosen secretions in the nose and lungs.  · Over-the-counter cold medicines will not shorten the length of time youre sick, but they may be helpful for the following symptoms: cough, sore throat, and nasal and sinus congestion. (Note: Do not use decongestants if you have high blood pressure.)  Follow-up care  Follow up with your healthcare provider, or as advised.  When to seek medical advice  Call your healthcare provider right away if any of these occur:  · Cough with lots of colored sputum (mucus)  · Severe headache; face, neck, or ear pain  · Difficulty swallowing due to throat pain  · Fever of 100.4°F (38°C)  Call 911, or get immediate medical care  Call emergency services right away if any of these occur:  · Chest pain, shortness of breath, wheezing, or difficulty breathing  · Coughing up blood  · Inability to swallow due to throat pain  Date Last Reviewed: 9/13/2015  © 2197-0669 [employer]. [address] [place] [address], [place] [address]. All rights reserved. This information is not intended as a substitute for  professional medical care. Always follow your healthcare professional's instructions.

## 2019-05-07 ENCOUNTER — OFFICE VISIT (OUTPATIENT)
Dept: URGENT CARE | Facility: CLINIC | Age: 24
End: 2019-05-07
Payer: COMMERCIAL

## 2019-05-07 VITALS
OXYGEN SATURATION: 100 % | RESPIRATION RATE: 16 BRPM | BODY MASS INDEX: 23.54 KG/M2 | HEIGHT: 67 IN | WEIGHT: 150 LBS | TEMPERATURE: 97 F | HEART RATE: 80 BPM | DIASTOLIC BLOOD PRESSURE: 78 MMHG | SYSTOLIC BLOOD PRESSURE: 110 MMHG

## 2019-05-07 DIAGNOSIS — R30.0 DYSURIA: Primary | ICD-10-CM

## 2019-05-07 LAB
B-HCG UR QL: NEGATIVE
BILIRUB UR QL STRIP: NEGATIVE
CTP QC/QA: YES
GLUCOSE UR QL STRIP: NEGATIVE
KETONES UR QL STRIP: NEGATIVE
LEUKOCYTE ESTERASE UR QL STRIP: POSITIVE
PH, POC UA: 6 (ref 5–8)
POC BLOOD, URINE: POSITIVE
POC NITRATES, URINE: NEGATIVE
PROT UR QL STRIP: POSITIVE
SP GR UR STRIP: 1.03 (ref 1–1.03)
UROBILINOGEN UR STRIP-ACNC: ABNORMAL (ref 0.1–1.1)

## 2019-05-07 PROCEDURE — 81025 POCT URINE PREGNANCY: ICD-10-PCS | Mod: S$GLB,,, | Performed by: PHYSICIAN ASSISTANT

## 2019-05-07 PROCEDURE — 3008F BODY MASS INDEX DOCD: CPT | Mod: CPTII,S$GLB,, | Performed by: PHYSICIAN ASSISTANT

## 2019-05-07 PROCEDURE — 99214 PR OFFICE/OUTPT VISIT, EST, LEVL IV, 30-39 MIN: ICD-10-PCS | Mod: 25,S$GLB,, | Performed by: PHYSICIAN ASSISTANT

## 2019-05-07 PROCEDURE — 81003 URINALYSIS AUTO W/O SCOPE: CPT | Mod: QW,S$GLB,, | Performed by: PHYSICIAN ASSISTANT

## 2019-05-07 PROCEDURE — 81025 URINE PREGNANCY TEST: CPT | Mod: S$GLB,,, | Performed by: PHYSICIAN ASSISTANT

## 2019-05-07 PROCEDURE — 99214 OFFICE O/P EST MOD 30 MIN: CPT | Mod: 25,S$GLB,, | Performed by: PHYSICIAN ASSISTANT

## 2019-05-07 PROCEDURE — 81003 POCT URINALYSIS, DIPSTICK, AUTOMATED, W/O SCOPE: ICD-10-PCS | Mod: QW,S$GLB,, | Performed by: PHYSICIAN ASSISTANT

## 2019-05-07 PROCEDURE — 3008F PR BODY MASS INDEX (BMI) DOCUMENTED: ICD-10-PCS | Mod: CPTII,S$GLB,, | Performed by: PHYSICIAN ASSISTANT

## 2019-05-07 RX ORDER — NITROFURANTOIN 25; 75 MG/1; MG/1
100 CAPSULE ORAL 2 TIMES DAILY
Qty: 14 CAPSULE | Refills: 0 | Status: SHIPPED | OUTPATIENT
Start: 2019-05-07 | End: 2019-05-14

## 2019-05-07 NOTE — PATIENT INSTRUCTIONS
"Thank you for coming to our Urgent Care today. It is important to remember that some problems are difficult to diagnose and may not be found during your first visit. Be sure to follow up with your primary care doctor.    Return to the Urgent Care with any questions/concerns or new/concerning symptoms. If symptoms worsening or fail to improve. Do not drive or make any important decisions for 24 hours if you have received any pain medications, sedatives or mood altering drugs during your Urgent Care visit.      Bladder Infection, Female (Adult)    Urine is normally doesn't have any bacteria in it. But bacteria can get into the urinary tract from the skin around the rectum. Or they can travel in the blood from elsewhere in the body. Once they are in your urinary tract, they can cause infection in the urethra (urethritis), the bladder (cystitis), or the kidneys (pyelonephritis).  The most common place for an infection is in the bladder. This is called a bladder infection. This is one of the most common infections in women. Most bladder infections are easily treated. They are not serious unless the infection spreads to the kidney.  The phrases "bladder infection," "UTI," and "cystitis" are often used to describe the same thing. But they are not always the same. Cystitis is an inflammation of the bladder. The most common cause of cystitis is an infection.  Symptoms  The infection causes inflammation in the urethra and bladder. This causes many of the symptoms. The most common symptoms of a bladder infection are:  · Pain or burning when urinating  · Having to urinate more often than usual  · Urgent need to urinate  · Only a small amount of urine comes out  · Blood in urine  · Abdominal discomfort. This is usually in the lower abdomen above the pubic bone.  · Cloudy urine  · Strong- or bad-smelling urine  · Unable to urinate (urinary retention)  · Unable to hold urine in (urinary incontinence)  · Fever  · Loss of " appetite  · Confusion (in older adults)  Causes  Bladder infections are not contagious. You can't get one from someone else, from a toilet seat, or from sharing a bath.  The most common cause of bladder infections is bacteria from the bowels. The bacteria get onto the skin around the opening of the urethra. From there, they can get into the urine and travel up to the bladder, causing inflammation and infection. This usually happens because of:  · Wiping improperly after urinating. Always wipe from front to back.  · Bowel incontinence  · Pregnancy  · Procedures such as having a catheter inserted  · Older age  · Not emptying your bladder. This can allow bacteria a chance to grow in your urine.  · Dehydration  · Constipation  · Sex  · Use of a diaphragm for birth control   Treatment  Bladder infections are diagnosed by a urine test. They are treated with antibiotics and usually clear up quickly without complications. Treatment helps prevent a more serious kidney infection.  Medicines  Medicines can help in the treatment of a bladder infection:  · Take antibiotics until they are used up, even if you feel better. It is important to finish them to make sure the infection has cleared.  · You can use acetaminophen or ibuprofen for pain, fever, or discomfort, unless another medicine was prescribed. If you have chronic liver or kidney disease, talk with your healthcare provider before using these medicines. Also talk with your provider if you've ever had a stomach ulcer or gastrointestinal bleeding, or are taking blood-thinner medicines.  · If you are given phenazopydridine to reduce burning with urination, it will cause your urine to become a bright orange color. This can stain clothing.  Care and prevention  These self-care steps can help prevent future infections:  · Drink plenty of fluids to prevent dehydration and flush out your bladder. Do this unless you must restrict fluids for other health reasons, or your doctor  told you not to.  · Proper cleaning after going to the bathroom is important. Wipe from front to back after using the toilet to prevent the spread of bacteria.  · Urinate more often. Don't try to hold urine in for a long time.  · Wear loose-fitting clothes and cotton underwear. Avoid tight-fitting pants.  · Improve your diet and prevent constipation. Eat more fresh fruit and vegetables, and fiber, and less junk and fatty foods.  · Avoid sex until your symptoms are gone.  · Avoid caffeine, alcohol, and spicy foods. These can irritate your bladder.  · Urinate right after intercourse to flush out your bladder.  · If you use birth control pills and have frequent bladder infections, discuss it with your doctor.  Follow-up care  Call your healthcare provider if all symptoms are not gone after 3 days of treatment. This is especially important if you have repeat infections.  If a culture was done, you will be told if your treatment needs to be changed. If directed, you can call to find out the results.  If X-rays were done, you will be told if the results will affect your treatment.  Call 911  Call 911 if any of the following occur:  · Trouble breathing  · Hard to wake up or confusion  · Fainting or loss of consciousness  · Rapid heart rate  When to seek medical advice  Call your healthcare provider right away if any of these occur:  · Fever of 100.4ºF (38.0ºC) or higher, or as directed by your healthcare provider  · Symptoms are not better by the third day of treatment  · Back or belly (abdominal) pain that gets worse  · Repeated vomiting, or unable to keep medicine down  · Weakness or dizziness  · Vaginal discharge  · Pain, redness, or swelling in the outer vaginal area (labia)  Date Last Reviewed: 10/1/2016  © 5507-9081 [employer]. [address] [place] [address], [place] [address]. All rights reserved. This information is not intended as a substitute for professional medical care. Always follow your healthcare  professional's instructions.

## 2019-05-07 NOTE — PROGRESS NOTES
"Subjective:       Patient ID: Iqra Carrillo is a 23 y.o. female.    Vitals:    05/07/19 1023   BP: 110/78   Pulse: 80   Resp: 16   Temp: 97.2 °F (36.2 °C)   SpO2: 100%   Weight: 68 kg (150 lb)   Height: 5' 7" (1.702 m)       Chief Complaint: Dysuria    Pt states urinary frequency, urgency, and burning since yesterday.    Dysuria    This is a new problem. The current episode started yesterday. The problem has been gradually worsening. The quality of the pain is described as burning. The pain is at a severity of 4/10. There has been no fever. She is sexually active. Associated symptoms include frequency and urgency. Pertinent negatives include no chills, nausea, vomiting or rash. She has tried nothing for the symptoms. Her past medical history is significant for recurrent UTIs.     Review of Systems   Constitution: Negative for chills and fever.   HENT: Negative for sore throat.    Eyes: Negative for blurred vision.   Cardiovascular: Negative for chest pain.   Respiratory: Negative for shortness of breath.    Skin: Negative for rash.   Musculoskeletal: Negative for back pain and joint pain.   Gastrointestinal: Negative for abdominal pain, diarrhea, nausea and vomiting.   Genitourinary: Positive for dysuria, frequency and urgency. Negative for pelvic pain.   Neurological: Negative for headaches.   Psychiatric/Behavioral: The patient is not nervous/anxious.        Objective:      Physical Exam   Constitutional: She is oriented to person, place, and time. She appears well-developed and well-nourished.   HENT:   Head: Normocephalic and atraumatic.   Right Ear: External ear normal.   Left Ear: External ear normal.   Nose: Nose normal.   Mouth/Throat: Mucous membranes are normal.   Eyes: Conjunctivae and lids are normal.   Neck: Trachea normal and full passive range of motion without pain. Neck supple.   Cardiovascular: Normal rate, regular rhythm and normal heart sounds.   Pulmonary/Chest: Effort normal and breath " sounds normal. No respiratory distress.   Abdominal: Soft. Normal appearance and bowel sounds are normal. She exhibits no distension, no abdominal bruit, no pulsatile midline mass and no mass. There is no tenderness. There is no rigidity, no rebound, no guarding, no CVA tenderness, no tenderness at McBurney's point and negative Reynoso's sign.   Musculoskeletal: Normal range of motion. She exhibits no edema.   Neurological: She is alert and oriented to person, place, and time. She has normal strength.   Skin: Skin is warm, dry and intact. She is not diaphoretic. No pallor.   Psychiatric: She has a normal mood and affect. Her speech is normal and behavior is normal. Judgment and thought content normal. Cognition and memory are normal.   Nursing note and vitals reviewed.      Assessment:       1. Dysuria        Plan:       Iqra was seen today for dysuria.    Diagnoses and all orders for this visit:    Dysuria  -     POCT Urinalysis, Dipstick, Automated, W/O Scope  -     POCT urine pregnancy  -     nitrofurantoin, macrocrystal-monohydrate, (MACROBID) 100 MG capsule; Take 1 capsule (100 mg total) by mouth 2 (two) times daily. for 7 days

## 2019-12-13 NOTE — PROGRESS NOTES
Subjective:       Patient ID: Iqra Carrillo is a 22 y.o. female.    Chief Complaint: Sore Throat    Popping of ears 4 days now       Sore Throat    This is a new (morning ) problem. The current episode started in the past 7 days (4 days ). There has been no fever. The pain is at a severity of 4/10. The pain is mild. Associated symptoms include congestion (most throat ). Pertinent negatives include no abdominal pain, coughing, ear pain, headaches, hoarse voice or shortness of breath. She has had no exposure to strep or mono. She has tried acetaminophen (flonase ) for the symptoms. The treatment provided mild relief.     Review of Systems   Constitution: Negative for chills, fever and malaise/fatigue.   HENT: Positive for congestion (most throat ) and sore throat. Negative for ear pain, headaches and hoarse voice.    Eyes: Negative for discharge and redness.   Cardiovascular: Negative for chest pain, dyspnea on exertion and leg swelling.   Respiratory: Negative for cough, shortness of breath, sputum production and wheezing.    Musculoskeletal: Negative for myalgias.   Gastrointestinal: Negative for abdominal pain and nausea.       Objective:      Physical Exam   Constitutional: She is oriented to person, place, and time. She appears well-developed and well-nourished. She is cooperative.  Non-toxic appearance. She does not appear ill. No distress.   HENT:   Head: Normocephalic and atraumatic.   Right Ear: Hearing, tympanic membrane, external ear and ear canal normal.   Left Ear: Hearing, tympanic membrane, external ear and ear canal normal.   Nose: Nose normal. No mucosal edema, rhinorrhea or nasal deformity. No epistaxis. Right sinus exhibits no maxillary sinus tenderness and no frontal sinus tenderness. Left sinus exhibits no maxillary sinus tenderness and no frontal sinus tenderness.   Mouth/Throat: Uvula is midline and mucous membranes are normal. No trismus in the jaw. Normal dentition. No uvula swelling.  Posterior oropharyngeal edema and posterior oropharyngeal erythema present.   Eyes: Conjunctivae and lids are normal. No scleral icterus.   Sclera clear bilat   Neck: Trachea normal, full passive range of motion without pain and phonation normal. Neck supple.   Cardiovascular: Normal rate, regular rhythm, normal heart sounds, intact distal pulses and normal pulses.    Pulmonary/Chest: Effort normal and breath sounds normal. No respiratory distress.   Abdominal: Soft. Normal appearance and bowel sounds are normal. She exhibits no distension. There is no tenderness.   Musculoskeletal: Normal range of motion. She exhibits no edema or deformity.   Lymphadenopathy:     She has cervical adenopathy.   Neurological: She is alert and oriented to person, place, and time. She exhibits normal muscle tone. Coordination normal.   Skin: Skin is warm, dry and intact. She is not diaphoretic. No pallor.   Psychiatric: She has a normal mood and affect. Her speech is normal and behavior is normal. Judgment and thought content normal. Cognition and memory are normal.   Nursing note and vitals reviewed.      Assessment:       1. Sore throat    2. Acute pharyngitis, unspecified etiology        Plan:       Iqra was seen today for sore throat.    Diagnoses and all orders for this visit:    Sore throat  -     POCT rapid strep A    Acute pharyngitis, unspecified etiology    Other orders  -     azithromycin (ZITHROMAX Z-SHOBHA) 250 MG tablet; Take 2 tablets (500 mg) on  Day 1,  followed by 1 tablet (250 mg) once daily on Days 2 through 5.  -     betamethasone acetate-betamethasone sodium phosphate injection 9 mg; Inject 1.5 mLs (9 mg total) into the muscle once.           Yes

## 2020-02-27 ENCOUNTER — OFFICE VISIT (OUTPATIENT)
Dept: URGENT CARE | Facility: CLINIC | Age: 25
End: 2020-02-27
Payer: COMMERCIAL

## 2020-02-27 VITALS
TEMPERATURE: 98 F | RESPIRATION RATE: 16 BRPM | OXYGEN SATURATION: 99 % | HEART RATE: 90 BPM | SYSTOLIC BLOOD PRESSURE: 134 MMHG | HEIGHT: 67 IN | BODY MASS INDEX: 25.11 KG/M2 | DIASTOLIC BLOOD PRESSURE: 90 MMHG | WEIGHT: 160 LBS

## 2020-02-27 DIAGNOSIS — J02.9 SORE THROAT: Primary | ICD-10-CM

## 2020-02-27 LAB
CTP QC/QA: YES
MOLECULAR STREP A: NEGATIVE

## 2020-02-27 PROCEDURE — 99214 OFFICE O/P EST MOD 30 MIN: CPT | Mod: S$GLB,,, | Performed by: NURSE PRACTITIONER

## 2020-02-27 PROCEDURE — 99214 PR OFFICE/OUTPT VISIT, EST, LEVL IV, 30-39 MIN: ICD-10-PCS | Mod: S$GLB,,, | Performed by: NURSE PRACTITIONER

## 2020-02-27 PROCEDURE — 87651 STREP A DNA AMP PROBE: CPT | Mod: QW,S$GLB,, | Performed by: NURSE PRACTITIONER

## 2020-02-27 PROCEDURE — 87651 POCT STREP A MOLECULAR: ICD-10-PCS | Mod: QW,S$GLB,, | Performed by: NURSE PRACTITIONER

## 2020-02-27 NOTE — PATIENT INSTRUCTIONS
Pharyngitis   If your condition worsens or fails to improve we recommend that you receive another evaluation at the ER immediately or contact your PCP to discuss your concerns or return here. You must understand that you've received an urgent care treatment only and that you may be released before all your medical problems are known or treated. You the patient will arrange for followup care as instructed.   If the strept culture was done and returns negative in 3-5 days and you are still having a sore throat, you may need to get a mono spot test done or repeated.   Tylenol or ibuprofen for pain may help as long as you are not allergic to these meds or have a medical condition such as stomach ulcers, liver or kidney disease or taking blood thinners etc that would   prevent you from using these medications.   Rest and fluids will help as well.   If you were prescribed antibiotics and are female and on BCP use additional methods to prevent pregnancy while on the antibiotics and for one cycle after       Self-Care for Sore Throats    Sore throats happen for many reasons, such as colds, allergies, and infections caused by viruses or bacteria. In any case, your throat becomes red and sore. Your goal for self-care is to reduce your discomfort while giving your throat a chance to heal.  Moisten and soothe your throat  Tips include the following:  · Try a sip of water first thing after waking up.  · Keep your throat moist by drinking 6 or more glasses of clear liquids every day.  · Run a cool-air humidifier in your room overnight.  · Avoid cigarette smoke.   · Suck on throat lozenges, cough drops, hard candy, ice chips, or frozen fruit-juice bars. Use the sugar-free versions if your diet or medical condition requires them.  Gargle to ease irritation  Gargling every hour or 2 can ease irritation. Try gargling with 1 of these solutions:  · 1/4 teaspoon of salt in 1/2 cup of  warm water  · An over-the-counter anesthetic gargle  Use medicine for more relief  Over-the-counter medicine can reduce sore throat symptoms. Ask your pharmacist if you have questions about which medicine to use:  · Ease pain with anesthetic sprays. Aspirin or an aspirin substitute also helps. Remember, never give aspirin to anyone 18 or younger, or if you are already taking blood thinners.   · For sore throats caused by allergies, try antihistamines to block the allergic reaction.  · Remember: unless a sore throat is caused by a bacterial infection, antibiotics wont help you.  Prevent future sore throats  Prevention tips include the following:  · Stop smoking or reduce contact with secondhand smoke. Smoke irritates the tender throat lining.  · Limit contact with pets and with allergy-causing substances, such as pollen and mold.  · When youre around someone with a sore throat or cold, wash your hands often to keep viruses or bacteria from spreading.  · Dont strain your vocal cords.  Call your healthcare provider  Contact your healthcare provider if you have:  · A temperature over 101°F (38.3°C)  · White spots on the throat  · Great difficulty swallowing  · Trouble breathing  · A skin rash  · Recent exposure to someone else with strep bacteria  · Severe hoarseness and swollen glands in the neck or jaw   Date Last Reviewed: 8/1/2016  © 7905-7485 [employer]. [address] [place] [address], [place] [address]. All rights reserved. This information is not intended as a substitute for professional medical care. Always follow your healthcare professional's instructions.

## 2020-02-27 NOTE — PROGRESS NOTES
"Subjective:       Patient ID: Iqra Carrillo is a 24 y.o. female.    Vitals:  height is 5' 7" (1.702 m) and weight is 72.6 kg (160 lb). Her temperature is 97.9 °F (36.6 °C). Her blood pressure is 134/90 (abnormal) and her pulse is 90. Her respiration is 16 and oxygen saturation is 99%.     Chief Complaint: Sore Throat    Pt states right sided throat pain and swelling x 3 days. Pt states a hx of tonsillitis.    Sore Throat    This is a new problem. The current episode started in the past 7 days. The problem has been unchanged. The pain is worse on the right side. There has been no fever. Associated symptoms include swollen glands. Pertinent negatives include no congestion, coughing, diarrhea, headaches, shortness of breath or vomiting. She has tried NSAIDs for the symptoms. The treatment provided mild relief.       Constitution: Negative for chills, fatigue and fever.   HENT: Positive for sore throat. Negative for congestion.    Neck: Negative for painful lymph nodes.   Cardiovascular: Negative for chest pain and leg swelling.   Eyes: Negative for double vision and blurred vision.   Respiratory: Negative for cough and shortness of breath.    Gastrointestinal: Negative for nausea, vomiting and diarrhea.   Genitourinary: Negative for dysuria, frequency, urgency and history of kidney stones.   Musculoskeletal: Negative for joint pain, joint swelling, muscle cramps and muscle ache.   Skin: Negative for color change, pale, rash and bruising.   Allergic/Immunologic: Negative for seasonal allergies.   Neurological: Negative for dizziness, history of vertigo, light-headedness, passing out and headaches.   Hematologic/Lymphatic: Negative for swollen lymph nodes.   Psychiatric/Behavioral: Negative for nervous/anxious, sleep disturbance and depression. The patient is not nervous/anxious.        Objective:      Physical Exam   Constitutional: She is oriented to person, place, and time. She appears well-developed and " well-nourished. She is cooperative.  Non-toxic appearance. She does not have a sickly appearance. She does not appear ill. No distress.   HENT:   Head: Normocephalic and atraumatic.   Right Ear: Hearing, tympanic membrane, external ear and ear canal normal.   Left Ear: Hearing, tympanic membrane, external ear and ear canal normal.   Nose: Nose normal. No mucosal edema, rhinorrhea or nasal deformity. No epistaxis. Right sinus exhibits no maxillary sinus tenderness and no frontal sinus tenderness. Left sinus exhibits no maxillary sinus tenderness and no frontal sinus tenderness.   Mouth/Throat: Uvula is midline and mucous membranes are normal. No trismus in the jaw. Normal dentition. No uvula swelling. Posterior oropharyngeal erythema present. No oropharyngeal exudate, posterior oropharyngeal edema, tonsillar abscesses or cobblestoning. Tonsils are 1+ on the right. Tonsils are 1+ on the left. Tonsillar exudate.   Eyes: Conjunctivae and lids are normal. No scleral icterus.   Neck: Trachea normal, full passive range of motion without pain and phonation normal. Neck supple. No neck rigidity. No edema and no erythema present.   Cardiovascular: Normal rate, regular rhythm, normal heart sounds, intact distal pulses and normal pulses.   Pulmonary/Chest: Effort normal and breath sounds normal. No respiratory distress. She has no decreased breath sounds. She has no rhonchi.   Abdominal: Normal appearance.   Musculoskeletal: Normal range of motion. She exhibits no edema or deformity.   Lymphadenopathy:     She has no cervical adenopathy.   Neurological: She is alert and oriented to person, place, and time. She exhibits normal muscle tone. Coordination normal.   Skin: Skin is warm, dry, intact, not diaphoretic and not pale.   Psychiatric: She has a normal mood and affect. Her speech is normal and behavior is normal. Judgment and thought content normal. Cognition and memory are normal.   Nursing note and vitals  reviewed.    Results for orders placed or performed in visit on 02/27/20   POCT Strep A, Molecular   Result Value Ref Range    Molecular Strep A, POC Negative Negative     Acceptable Yes          Assessment:       1. Sore throat        Plan:         Sore throat  -     POCT Strep A, Molecular      Patient Instructions                                                           Pharyngitis   If your condition worsens or fails to improve we recommend that you receive another evaluation at the ER immediately or contact your PCP to discuss your concerns or return here. You must understand that you've received an urgent care treatment only and that you may be released before all your medical problems are known or treated. You the patient will arrange for followup care as instructed.   If the strept culture was done and returns negative in 3-5 days and you are still having a sore throat, you may need to get a mono spot test done or repeated.   Tylenol or ibuprofen for pain may help as long as you are not allergic to these meds or have a medical condition such as stomach ulcers, liver or kidney disease or taking blood thinners etc that would   prevent you from using these medications.   Rest and fluids will help as well.   If you were prescribed antibiotics and are female and on BCP use additional methods to prevent pregnancy while on the antibiotics and for one cycle after       Self-Care for Sore Throats    Sore throats happen for many reasons, such as colds, allergies, and infections caused by viruses or bacteria. In any case, your throat becomes red and sore. Your goal for self-care is to reduce your discomfort while giving your throat a chance to heal.  Moisten and soothe your throat  Tips include the following:  · Try a sip of water first thing after waking up.  · Keep your throat moist by drinking 6 or more glasses of clear liquids every day.  · Run a cool-air humidifier in your room overnight.  · Avoid  cigarette smoke.   · Suck on throat lozenges, cough drops, hard candy, ice chips, or frozen fruit-juice bars. Use the sugar-free versions if your diet or medical condition requires them.  Gargle to ease irritation  Gargling every hour or 2 can ease irritation. Try gargling with 1 of these solutions:  · 1/4 teaspoon of salt in 1/2 cup of warm water  · An over-the-counter anesthetic gargle  Use medicine for more relief  Over-the-counter medicine can reduce sore throat symptoms. Ask your pharmacist if you have questions about which medicine to use:  · Ease pain with anesthetic sprays. Aspirin or an aspirin substitute also helps. Remember, never give aspirin to anyone 18 or younger, or if you are already taking blood thinners.   · For sore throats caused by allergies, try antihistamines to block the allergic reaction.  · Remember: unless a sore throat is caused by a bacterial infection, antibiotics wont help you.  Prevent future sore throats  Prevention tips include the following:  · Stop smoking or reduce contact with secondhand smoke. Smoke irritates the tender throat lining.  · Limit contact with pets and with allergy-causing substances, such as pollen and mold.  · When youre around someone with a sore throat or cold, wash your hands often to keep viruses or bacteria from spreading.  · Dont strain your vocal cords.  Call your healthcare provider  Contact your healthcare provider if you have:  · A temperature over 101°F (38.3°C)  · White spots on the throat  · Great difficulty swallowing  · Trouble breathing  · A skin rash  · Recent exposure to someone else with strep bacteria  · Severe hoarseness and swollen glands in the neck or jaw   Date Last Reviewed: 8/1/2016  © 2719-2140 [employer]. [address] [place] [address], [place] [address]. All rights reserved. This information is not intended as a substitute for professional medical care. Always follow your healthcare professional's instructions.

## 2020-10-05 ENCOUNTER — PATIENT MESSAGE (OUTPATIENT)
Dept: ADMINISTRATIVE | Facility: HOSPITAL | Age: 25
End: 2020-10-05

## 2021-01-04 ENCOUNTER — PATIENT MESSAGE (OUTPATIENT)
Dept: ADMINISTRATIVE | Facility: HOSPITAL | Age: 26
End: 2021-01-04

## 2021-04-05 ENCOUNTER — PATIENT MESSAGE (OUTPATIENT)
Dept: ADMINISTRATIVE | Facility: HOSPITAL | Age: 26
End: 2021-04-05

## 2021-04-28 ENCOUNTER — PATIENT MESSAGE (OUTPATIENT)
Dept: RESEARCH | Facility: HOSPITAL | Age: 26
End: 2021-04-28

## 2021-07-06 ENCOUNTER — PATIENT MESSAGE (OUTPATIENT)
Dept: ADMINISTRATIVE | Facility: HOSPITAL | Age: 26
End: 2021-07-06

## 2023-06-05 ENCOUNTER — LAB VISIT (OUTPATIENT)
Dept: LAB | Facility: OTHER | Age: 28
End: 2023-06-05
Attending: OBSTETRICS & GYNECOLOGY
Payer: COMMERCIAL

## 2023-06-05 ENCOUNTER — OFFICE VISIT (OUTPATIENT)
Dept: OBSTETRICS AND GYNECOLOGY | Facility: CLINIC | Age: 28
End: 2023-06-05
Payer: COMMERCIAL

## 2023-06-05 ENCOUNTER — TELEPHONE (OUTPATIENT)
Dept: OBSTETRICS AND GYNECOLOGY | Facility: CLINIC | Age: 28
End: 2023-06-05

## 2023-06-05 VITALS
BODY MASS INDEX: 29.13 KG/M2 | DIASTOLIC BLOOD PRESSURE: 70 MMHG | HEIGHT: 67 IN | WEIGHT: 185.63 LBS | SYSTOLIC BLOOD PRESSURE: 108 MMHG

## 2023-06-05 DIAGNOSIS — B00.9 HERPES: ICD-10-CM

## 2023-06-05 DIAGNOSIS — Z01.419 ENCOUNTER FOR GYNECOLOGICAL EXAMINATION WITHOUT ABNORMAL FINDING: Primary | ICD-10-CM

## 2023-06-05 DIAGNOSIS — Z30.9 ENCOUNTER FOR CONTRACEPTIVE MANAGEMENT, UNSPECIFIED TYPE: ICD-10-CM

## 2023-06-05 DIAGNOSIS — B00.9 HSV INFECTION: ICD-10-CM

## 2023-06-05 DIAGNOSIS — N92.6 IRREGULAR MENSTRUAL CYCLE: ICD-10-CM

## 2023-06-05 LAB
DHEA-S SERPL-MCNC: 438.3 UG/DL (ref 95.8–511.7)
ESTRADIOL SERPL-MCNC: 71 PG/ML
FSH SERPL-ACNC: 4.99 MIU/ML
LH SERPL-ACNC: 14 MIU/ML
PROLACTIN SERPL IA-MCNC: 7 NG/ML (ref 5.2–26.5)
TSH SERPL DL<=0.005 MIU/L-ACNC: 2.38 UIU/ML (ref 0.4–4)

## 2023-06-05 PROCEDURE — 82670 ASSAY OF TOTAL ESTRADIOL: CPT | Performed by: OBSTETRICS & GYNECOLOGY

## 2023-06-05 PROCEDURE — 99999 PR PBB SHADOW E&M-EST. PATIENT-LVL III: CPT | Mod: PBBFAC,,, | Performed by: OBSTETRICS & GYNECOLOGY

## 2023-06-05 PROCEDURE — 3078F PR MOST RECENT DIASTOLIC BLOOD PRESSURE < 80 MM HG: ICD-10-PCS | Mod: CPTII,S$GLB,, | Performed by: OBSTETRICS & GYNECOLOGY

## 2023-06-05 PROCEDURE — 84146 ASSAY OF PROLACTIN: CPT | Performed by: OBSTETRICS & GYNECOLOGY

## 2023-06-05 PROCEDURE — 1160F PR REVIEW ALL MEDS BY PRESCRIBER/CLIN PHARMACIST DOCUMENTED: ICD-10-PCS | Mod: CPTII,S$GLB,, | Performed by: OBSTETRICS & GYNECOLOGY

## 2023-06-05 PROCEDURE — 1159F MED LIST DOCD IN RCRD: CPT | Mod: CPTII,S$GLB,, | Performed by: OBSTETRICS & GYNECOLOGY

## 2023-06-05 PROCEDURE — 99999 PR PBB SHADOW E&M-EST. PATIENT-LVL III: ICD-10-PCS | Mod: PBBFAC,,, | Performed by: OBSTETRICS & GYNECOLOGY

## 2023-06-05 PROCEDURE — 99385 PR PREVENTIVE VISIT,NEW,18-39: ICD-10-PCS | Mod: S$GLB,,, | Performed by: OBSTETRICS & GYNECOLOGY

## 2023-06-05 PROCEDURE — 3078F DIAST BP <80 MM HG: CPT | Mod: CPTII,S$GLB,, | Performed by: OBSTETRICS & GYNECOLOGY

## 2023-06-05 PROCEDURE — 99385 PREV VISIT NEW AGE 18-39: CPT | Mod: S$GLB,,, | Performed by: OBSTETRICS & GYNECOLOGY

## 2023-06-05 PROCEDURE — 3008F BODY MASS INDEX DOCD: CPT | Mod: CPTII,S$GLB,, | Performed by: OBSTETRICS & GYNECOLOGY

## 2023-06-05 PROCEDURE — 88175 CYTOPATH C/V AUTO FLUID REDO: CPT | Performed by: OBSTETRICS & GYNECOLOGY

## 2023-06-05 PROCEDURE — 1160F RVW MEDS BY RX/DR IN RCRD: CPT | Mod: CPTII,S$GLB,, | Performed by: OBSTETRICS & GYNECOLOGY

## 2023-06-05 PROCEDURE — 84443 ASSAY THYROID STIM HORMONE: CPT | Performed by: OBSTETRICS & GYNECOLOGY

## 2023-06-05 PROCEDURE — 83002 ASSAY OF GONADOTROPIN (LH): CPT | Performed by: OBSTETRICS & GYNECOLOGY

## 2023-06-05 PROCEDURE — 3008F PR BODY MASS INDEX (BMI) DOCUMENTED: ICD-10-PCS | Mod: CPTII,S$GLB,, | Performed by: OBSTETRICS & GYNECOLOGY

## 2023-06-05 PROCEDURE — 36415 COLL VENOUS BLD VENIPUNCTURE: CPT | Performed by: OBSTETRICS & GYNECOLOGY

## 2023-06-05 PROCEDURE — 84403 ASSAY OF TOTAL TESTOSTERONE: CPT | Performed by: OBSTETRICS & GYNECOLOGY

## 2023-06-05 PROCEDURE — 82627 DEHYDROEPIANDROSTERONE: CPT | Performed by: OBSTETRICS & GYNECOLOGY

## 2023-06-05 PROCEDURE — 83001 ASSAY OF GONADOTROPIN (FSH): CPT | Performed by: OBSTETRICS & GYNECOLOGY

## 2023-06-05 PROCEDURE — 3074F PR MOST RECENT SYSTOLIC BLOOD PRESSURE < 130 MM HG: ICD-10-PCS | Mod: CPTII,S$GLB,, | Performed by: OBSTETRICS & GYNECOLOGY

## 2023-06-05 PROCEDURE — 1159F PR MEDICATION LIST DOCUMENTED IN MEDICAL RECORD: ICD-10-PCS | Mod: CPTII,S$GLB,, | Performed by: OBSTETRICS & GYNECOLOGY

## 2023-06-05 PROCEDURE — 84270 ASSAY OF SEX HORMONE GLOBUL: CPT | Performed by: OBSTETRICS & GYNECOLOGY

## 2023-06-05 PROCEDURE — 3074F SYST BP LT 130 MM HG: CPT | Mod: CPTII,S$GLB,, | Performed by: OBSTETRICS & GYNECOLOGY

## 2023-06-05 RX ORDER — DEXTROAMPHETAMINE SACCHARATE, AMPHETAMINE ASPARTATE MONOHYDRATE, DEXTROAMPHETAMINE SULFATE AND AMPHETAMINE SULFATE 5; 5; 5; 5 MG/1; MG/1; MG/1; MG/1
CAPSULE, EXTENDED RELEASE ORAL
COMMUNITY
Start: 2023-05-16

## 2023-06-05 RX ORDER — LACTIC ACID, L-, CITRIC ACID MONOHYDRATE, AND POTASSIUM BITARTRATE 90; 50; 20 MG/5G; MG/5G; MG/5G
1 GEL VAGINAL
Qty: 60 G | Refills: 12 | Status: SHIPPED | OUTPATIENT
Start: 2023-06-05

## 2023-06-05 RX ORDER — VALACYCLOVIR HYDROCHLORIDE 500 MG/1
500 TABLET, FILM COATED ORAL 2 TIMES DAILY
Qty: 60 TABLET | Refills: 6 | Status: SHIPPED | OUTPATIENT
Start: 2023-06-05 | End: 2023-06-10

## 2023-06-05 RX ORDER — LACTIC ACID, L-, CITRIC ACID MONOHYDRATE, AND POTASSIUM BITARTRATE 90; 50; 20 MG/5G; MG/5G; MG/5G
1 GEL VAGINAL
Qty: 60 G | Refills: 12 | Status: SHIPPED | OUTPATIENT
Start: 2023-06-05 | End: 2023-06-05 | Stop reason: SDUPTHER

## 2023-06-05 NOTE — PROGRESS NOTES
"CC: Well woman exam    Iqra Carrillo is a 27 y.o. female  presents for a well woman exam.    History of herpes.  She does not have frequent outbreaks.  She would like refill on valtrex.  She is tracking her cycle.  She has abnormal bleeding, skipping months at a time  First menses- 15 years old  She started birth control at age 16 for cycle regulation and pain control   She stopped having cycles on lo loestrin FE.   Last cycle was 2022.   She has gained some weight as well.  She is planning to get  in .  Not using birth control at this time      Past Medical History:   Diagnosis Date    ADHD (attention deficit hyperactivity disorder)     Allergy     Herpes simplex virus (HSV) infection        Past Surgical History:   Procedure Laterality Date    BREAST SURGERY         OB History    Para Term  AB Living   0 0 0 0 0 0   SAB IAB Ectopic Multiple Live Births   0 0 0 0 0       Family History   Problem Relation Age of Onset    Cancer Maternal Grandfather         kidney    No Known Problems Mother     No Known Problems Father     No Known Problems Sister     No Known Problems Brother     Asthma Neg Hx     Birth defects Neg Hx     Chromosomal disorder Neg Hx     Diabetes Neg Hx     Early death Neg Hx     Heart disease Neg Hx     Hyperlipidemia Neg Hx     Hypertension Neg Hx     Mental illness Neg Hx     Seizures Neg Hx     Thyroid disease Neg Hx     Other Neg Hx     Melanoma Neg Hx     Psoriasis Neg Hx     Lupus Neg Hx        Social History     Tobacco Use    Smoking status: Never    Smokeless tobacco: Never   Substance Use Topics    Alcohol use: Yes     Comment: socially    Drug use: No       /70   Ht 5' 7" (1.702 m)   Wt 84.2 kg (185 lb 10 oz)   LMP 2023 (Exact Date)   BMI 29.07 kg/m²     ROS:  GENERAL: Denies weight gain or weight loss. Feeling well overall.   SKIN: Denies rash or lesions.   HEAD: Denies head injury or headache.   NODES: Denies enlarged " lymph nodes.   CHEST: Denies chest pain or shortness of breath.   CARDIOVASCULAR: Denies palpitations or left sided chest pain.   ABDOMEN: No abdominal pain, constipation, diarrhea, nausea, vomiting or rectal bleeding.   URINARY: No frequency, dysuria, hematuria, or burning on urination.  REPRODUCTIVE: See HPI.   BREASTS: The patient performs breast self-examination and denies pain, lumps, or nipple discharge.   HEMATOLOGIC: No easy bruisability or excessive bleeding.  MUSCULOSKELETAL: Denies joint pain or swelling.   NEUROLOGIC: Denies syncope or weakness.   PSYCHIATRIC: Denies depression, anxiety or mood swings.    Physical Exam:    APPEARANCE: Well nourished, well developed, in no acute distress.  AFFECT: WNL, alert and oriented x 3  SKIN: No acne or hirsutism  NECK: Neck symmetric without masses or thyromegaly  NODES: No inguinal, cervical, axillary, or femoral lymph node enlargement  CHEST: Good respiratory effect  ABDOMEN: Soft.  No tenderness or masses.  No hepatosplenomegaly.  No hernias.  BREASTS: Symmetrical, no skin changes or visible lesions.  No palpable masses, nipple discharge bilaterally.  PELVIC: Normal external genitalia without lesions.  Normal hair distribution.  Adequate perineal body, normal urethral meatus.  Vagina moist and well rugated without lesions or discharge.  Cervix pink, without lesions, discharge or tenderness.  No significant cystocele or rectocele.  Bimanual exam shows uterus to be normal size, regular, mobile and nontender.  Adnexa without masses or tenderness.    EXTREMITIES: No edema.      ASSESSMENT AND PLAN  1. Encounter for gynecological examination without abnormal finding  POCT urine pregnancy    Liquid-Based Pap Smear, Screening      2. Encounter for contraceptive management, unspecified type  lactic acid-citric-potassium (PHEXXI) 1.8-1-0.4 % Gel    DISCONTINUED: lactic acid-citric-potassium (PHEXXI) 1.8-1-0.4 % Gel      3. Irregular menstrual cycle  Prolactin     TESTOSTERONE PANEL    DHEA-SULFATE    LUTEINIZING HORMONE    FOLLICLE STIMULATING HORMONE    Estradiol    US Pelvis Comp with Transvag NON-OB (xpd    TSH    lactic acid-citric-potassium (PHEXXI) 1.8-1-0.4 % Gel    HEMOGLOBIN A1C    DISCONTINUED: lactic acid-citric-potassium (PHEXXI) 1.8-1-0.4 % Gel      4. Herpes        5. HSV infection  valACYclovir (VALTREX) 500 MG tablet        Discussed oligomenorrhea and possible causes/  PCOS/  weight flucuations and medical conditions  Primerose oil  Zinc 30 mg daily   VIt D   Elijah inositol      Patient was counseled today on A.C.S. Pap guidelines and recommendations for yearly pelvic exams, mammograms and monthly self breast exams; to see her PCP for other health maintenance.     Follow up to discuss labs/ US as a virtual    Follow up in about 1 year (around 6/5/2024), or if symptoms worsen or fail to improve.

## 2023-06-12 ENCOUNTER — LAB VISIT (OUTPATIENT)
Dept: LAB | Facility: OTHER | Age: 28
End: 2023-06-12
Attending: OBSTETRICS & GYNECOLOGY
Payer: COMMERCIAL

## 2023-06-12 DIAGNOSIS — N92.6 IRREGULAR MENSTRUAL CYCLE: ICD-10-CM

## 2023-06-12 LAB
ESTIMATED AVG GLUCOSE: 94 MG/DL (ref 68–131)
HBA1C MFR BLD: 4.9 % (ref 4–5.6)

## 2023-06-12 PROCEDURE — 83036 HEMOGLOBIN GLYCOSYLATED A1C: CPT | Performed by: OBSTETRICS & GYNECOLOGY

## 2023-06-12 PROCEDURE — 36415 COLL VENOUS BLD VENIPUNCTURE: CPT | Performed by: OBSTETRICS & GYNECOLOGY

## 2023-06-13 LAB
FINAL PATHOLOGIC DIAGNOSIS: NORMAL
Lab: NORMAL

## 2023-06-14 LAB
ALBUMIN SERPL-MCNC: 4.7 G/DL (ref 3.6–5.1)
SHBG SERPL-SCNC: 25 NMOL/L (ref 17–124)
TESTOST FREE SERPL-MCNC: 7.1 PG/ML (ref 0.2–5)
TESTOST SERPL-MCNC: 50 NG/DL (ref 2–45)
TESTOSTERONE.FREE+WB SERPL-MCNC: 15.3 NG/DL (ref 0.5–8.5)

## 2023-06-16 ENCOUNTER — OFFICE VISIT (OUTPATIENT)
Dept: OBSTETRICS AND GYNECOLOGY | Facility: CLINIC | Age: 28
End: 2023-06-16
Payer: COMMERCIAL

## 2023-06-16 DIAGNOSIS — E28.2 PCOS (POLYCYSTIC OVARIAN SYNDROME): Primary | ICD-10-CM

## 2023-06-16 PROCEDURE — 3044F PR MOST RECENT HEMOGLOBIN A1C LEVEL <7.0%: ICD-10-PCS | Mod: CPTII,95,, | Performed by: OBSTETRICS & GYNECOLOGY

## 2023-06-16 PROCEDURE — 99215 OFFICE O/P EST HI 40 MIN: CPT | Mod: 95,,, | Performed by: OBSTETRICS & GYNECOLOGY

## 2023-06-16 PROCEDURE — 99215 PR OFFICE/OUTPT VISIT, EST, LEVL V, 40-54 MIN: ICD-10-PCS | Mod: 95,,, | Performed by: OBSTETRICS & GYNECOLOGY

## 2023-06-16 PROCEDURE — 3044F HG A1C LEVEL LT 7.0%: CPT | Mod: CPTII,95,, | Performed by: OBSTETRICS & GYNECOLOGY

## 2023-06-16 NOTE — PROGRESS NOTES
The patient location is: home    The chief complaint leading to consultation is: PCOS    Visit type: audiovisual    Face to Face time with patient: 15    minutes of total time spent on the encounter, which includes face to face time and non-face to face time preparing to see the patient (eg, review of tests), Obtaining and/or reviewing separately obtained history, Documenting clinical information in the electronic or other health record, Independently interpreting results (not separately reported) and communicating results to the patient/family/caregiver, or Care coordination (not separately reported).         Each patient to whom he or she provides medical services by telemedicine is:  (1) informed of the relationship between the physician and patient and the respective role of any other health care provider with respect to management of the patient; and (2) notified that he or she may decline to receive medical services by telemedicine and may withdraw from such care at any time.    Notes:       Iqra Carrillo is a 27 y.o. female  presents for  follow up of labs and irregular cycles  PCOS diagnosed by + Lab and clinical findings- reviewed with patient    She is using holding off on US for now due to cost and not needed for the PCOS diagnosis      Past Medical History:   Diagnosis Date    ADHD (attention deficit hyperactivity disorder)     Allergy     Herpes simplex virus (HSV) infection        Past Surgical History:   Procedure Laterality Date    BREAST SURGERY         OB History    Para Term  AB Living   0 0 0 0 0 0   SAB IAB Ectopic Multiple Live Births   0 0 0 0 0       Family History   Problem Relation Age of Onset    Cancer Maternal Grandfather         kidney    No Known Problems Mother     No Known Problems Father     No Known Problems Sister     No Known Problems Brother     Asthma Neg Hx     Birth defects Neg Hx     Chromosomal disorder Neg Hx     Diabetes Neg Hx     Early  death Neg Hx     Heart disease Neg Hx     Hyperlipidemia Neg Hx     Hypertension Neg Hx     Mental illness Neg Hx     Seizures Neg Hx     Thyroid disease Neg Hx     Other Neg Hx     Melanoma Neg Hx     Psoriasis Neg Hx     Lupus Neg Hx        Social History     Tobacco Use    Smoking status: Never    Smokeless tobacco: Never   Substance Use Topics    Alcohol use: Yes     Comment: socially    Drug use: No       LMP 03/21/2023 (Exact Date)     ROS:  GENERAL: Denies weight gain or weight loss. Feeling well overall.   SKIN: Denies rash or lesions.   HEAD: Denies head injury or headache.   NODES: Denies enlarged lymph nodes.   CHEST: Denies chest pain or shortness of breath.   CARDIOVASCULAR: Denies palpitations or left sided chest pain.   ABDOMEN: No abdominal pain, constipation, diarrhea, nausea, vomiting or rectal bleeding.   URINARY: No frequency, dysuria, hematuria, or burning on urination.  REPRODUCTIVE: See HPI.   BREASTS: The patient performs breast self-examination and denies pain, lumps, or nipple discharge.   HEMATOLOGIC: No easy bruisability or excessive bleeding.  MUSCULOSKELETAL: Denies joint pain or swelling.   NEUROLOGIC: Denies syncope or weakness.   PSYCHIATRIC: Denies depression, anxiety or mood swings.    Physical Exam:    APPEARANCE: Well nourished, well developed, in no acute distress.  AFFECT: WNL, alert and oriented x 3  SKIN: No acne or hirsutism      ASSESSMENT AND PLAN  1. PCOS (polycystic ovarian syndrome)          Discussed PCOS center.  Discussed the team of providers who work with the patient on GYN concerns,  Cycle control,  Fertility management,  Ovarian health and possible hormonal treatment options.  Endocrinology provider who manages possible insulin resistance and pre diabetic concerns, as well as the hormonal concerns.  Medi weight loss team for coaching, weight loss strategies, supplements and nutritional counseling.  Walk and talk therapy- possible behavorial modifications,  possible increased risks for anxiety and depression  Dermatology referrals for possible skin conditions, acne and hirsutism  Fertility treatments with [facility].    Inositol  Fish Oil  Vitamin D Supplements for PCOS  N-Acetyl-Cysteine (NAC)  Carnitine Supplements for PCOS  Magnesium  Probiotics  Zinc     B Vitamin Supplements for PCOS    Will avoid spironolactone and OCPs for now    Patient was counseled today on A.C.S. Pap guidelines and recommendations for yearly pelvic exams, mammograms and monthly self breast exams; to see her PCP for other health maintenance.     F/U as needed

## 2024-05-06 ENCOUNTER — PATIENT MESSAGE (OUTPATIENT)
Dept: OBSTETRICS AND GYNECOLOGY | Facility: CLINIC | Age: 29
End: 2024-05-06

## 2024-05-06 ENCOUNTER — CLINICAL SUPPORT (OUTPATIENT)
Dept: OBSTETRICS AND GYNECOLOGY | Facility: CLINIC | Age: 29
End: 2024-05-06
Payer: COMMERCIAL

## 2024-05-06 DIAGNOSIS — N91.2 AMENORRHEA: Primary | ICD-10-CM

## 2024-05-06 PROCEDURE — 99999 PR PBB SHADOW E&M-EST. PATIENT-LVL II: CPT | Mod: PBBFAC,,,

## 2024-05-06 NOTE — PROGRESS NOTES
Spoke with patient for a total of 30 minutes during virtual visit.  Updated chart to reflect up to date patient demographics.  Allergies, medications, pharmacy, medical/family history and OB history updated.  Patient was guided through expectations of care during pregnancy.  Pregnancy confirmation, dating u/s & first routine OB appts scheduled.  Education provided & questions answered. Encouraged to send message or call office with any questions/concerns. Verbalized understanding.     Discussed with pt:    Encouraged to begin taking PNV   denies n/v  C/o mild cramping/no spotting  Precautions discussed  Referred to [email or web address] for Preg A to Z guide & class schedule   Discussed benefits of breastfeeding    Discussed need for pediatrician  Reports irreg cycles due to PCOS

## 2024-05-07 ENCOUNTER — E-VISIT (OUTPATIENT)
Dept: OBSTETRICS AND GYNECOLOGY | Facility: CLINIC | Age: 29
End: 2024-05-07
Payer: COMMERCIAL

## 2024-05-07 DIAGNOSIS — E28.2 PCOS (POLYCYSTIC OVARIAN SYNDROME): ICD-10-CM

## 2024-05-07 DIAGNOSIS — Z32.01 POSITIVE PREGNANCY TEST: Primary | ICD-10-CM

## 2024-05-07 PROCEDURE — 99421 OL DIG E/M SVC 5-10 MIN: CPT | Mod: ,,, | Performed by: OBSTETRICS & GYNECOLOGY

## 2024-05-07 NOTE — PROGRESS NOTES
Patient ID: Iqra Carrillo is a 28 y.o. female.    Chief Complaint: Medication Management (Entered automatically based on patient selection in Patient Portal.)    The patient initiated a request through [employer] on 5/7/2024 for evaluation and management with a chief complaint of Medication Management (Entered automatically based on patient selection in Patient Portal.)     I evaluated the questionnaire submission on 5/7/2024.    [facility] P[facility] Evisit Medication    5/7/2024  8:43 AM CDT - Filed by Patient   Do you agree to participate in an E-Visit? Yes   If you have any of the following symptoms, please present to your local emergency room or call 911:  I acknowledge   Medication requests for narcotics will not be addressed via an E-Visit.  Please schedule an appointment. I acknowledge   Are you pregnant, could you be pregnant, or are you breast feeding? Pregnant   Do you want to address a new or existing medication? I would like to address a medication I currently take   What is the main issue you would like addressed today? In our visit last year, you recommended that I take a couple vitamins to help/balance my PCOS symptoms. I am pregnant now and started on pre-natals. Should I continue to take the vitamins too?   Would you like to change or continue your medication? Continue medication   What medication would you like to continue?  vitamins for PCOS   Are you taking it as prescribed? Yes    What medical condition is the  medication intended to treat? pcos   Is the medication helping your condition? Yes   Are you having any side effects from the medication? No   Provide any additional information you feel is important.    Please attach any relevant images or files    Are you able to take your vital signs? No         Encounter Diagnoses   Name Primary?    Positive pregnancy test Yes    PCOS (polycystic ovarian syndrome)         No orders of the defined types were placed in this encounter.       Continue on  PNV  Can stop the other supplements for PCOS and will monitor and address during prental visits for further intervention    No follow-ups on file.      E-Visit Time Tracking:    Day 1 Time (in minutes): 5    Total Time (in minutes): 5

## 2024-05-23 ENCOUNTER — OFFICE VISIT (OUTPATIENT)
Dept: OBSTETRICS AND GYNECOLOGY | Facility: CLINIC | Age: 29
End: 2024-05-23
Payer: COMMERCIAL

## 2024-05-23 ENCOUNTER — HOSPITAL ENCOUNTER (OUTPATIENT)
Dept: PERINATAL CARE | Facility: OTHER | Age: 29
Discharge: HOME | End: 2024-05-23
Attending: FAMILY MEDICINE
Payer: COMMERCIAL

## 2024-05-23 VITALS
HEIGHT: 68 IN | BODY MASS INDEX: 28.1 KG/M2 | DIASTOLIC BLOOD PRESSURE: 66 MMHG | SYSTOLIC BLOOD PRESSURE: 108 MMHG | WEIGHT: 185.44 LBS

## 2024-05-23 DIAGNOSIS — N91.2 AMENORRHEA: ICD-10-CM

## 2024-05-23 DIAGNOSIS — N91.4 SECONDARY OLIGOMENORRHEA: ICD-10-CM

## 2024-05-23 DIAGNOSIS — A60.00 GENITAL HERPES SIMPLEX, UNSPECIFIED SITE: ICD-10-CM

## 2024-05-23 DIAGNOSIS — B00.9 HERPES: ICD-10-CM

## 2024-05-23 DIAGNOSIS — Z32.01 POSITIVE PREGNANCY TEST: Primary | ICD-10-CM

## 2024-05-23 PROCEDURE — 1159F MED LIST DOCD IN RCRD: CPT | Mod: CPTII,S$GLB,, | Performed by: FAMILY MEDICINE

## 2024-05-23 PROCEDURE — 87086 URINE CULTURE/COLONY COUNT: CPT | Performed by: FAMILY MEDICINE

## 2024-05-23 PROCEDURE — 3008F BODY MASS INDEX DOCD: CPT | Mod: CPTII,S$GLB,, | Performed by: FAMILY MEDICINE

## 2024-05-23 PROCEDURE — 99999 PR PBB SHADOW E&M-EST. PATIENT-LVL III: CPT | Mod: PBBFAC,,, | Performed by: FAMILY MEDICINE

## 2024-05-23 PROCEDURE — 1160F RVW MEDS BY RX/DR IN RCRD: CPT | Mod: CPTII,S$GLB,, | Performed by: FAMILY MEDICINE

## 2024-05-23 PROCEDURE — 76801 OB US < 14 WKS SINGLE FETUS: CPT

## 2024-05-23 PROCEDURE — 99214 OFFICE O/P EST MOD 30 MIN: CPT | Mod: S$GLB,,, | Performed by: FAMILY MEDICINE

## 2024-05-23 PROCEDURE — 76801 OB US < 14 WKS SINGLE FETUS: CPT | Mod: 26,,, | Performed by: OBSTETRICS & GYNECOLOGY

## 2024-05-23 PROCEDURE — 3074F SYST BP LT 130 MM HG: CPT | Mod: CPTII,S$GLB,, | Performed by: FAMILY MEDICINE

## 2024-05-23 PROCEDURE — 87591 N.GONORRHOEAE DNA AMP PROB: CPT | Performed by: FAMILY MEDICINE

## 2024-05-23 PROCEDURE — 3078F DIAST BP <80 MM HG: CPT | Mod: CPTII,S$GLB,, | Performed by: FAMILY MEDICINE

## 2024-05-23 RX ORDER — VALACYCLOVIR HYDROCHLORIDE 500 MG/1
500 TABLET, FILM COATED ORAL 2 TIMES DAILY
Qty: 30 TABLET | Refills: 11 | Status: SHIPPED | OUTPATIENT
Start: 2024-05-23 | End: 2024-05-26

## 2024-05-23 NOTE — PATIENT INSTRUCTIONS
LABOR AND DELIVERY PHONE NUMBER, [phone] (OPEN , LOCATED ON 6TH FLOOR OF HOSPITAL)  [address] PHONE NUMBER, [phone] (OPEN MON-FRI, 8a-5p)     1) Eat small frequent meals through the day versus three large meals  2) Try ginger ale or sprite to help settle the stomach - you can also take ginger capsules (250mg 4 times per day)  3) Eat crackers or dry toast before getting out of bed in the morning   4) Stay hydrated by drinking plenty of water-do not immediately eat or drink something after vomiting. Give your stomach a rest for 20-30 minutes. Slowly reintroduce ice chips, small sips water, crackers, etc.    5) Try OTC unisom (1/2 tablet) and vitamin B6 - take the 25mg b6 twice a day and then both together at night before bed. This can help with the nausea in the morning and is safe to use during pregnancy.  6) Sea bands (Accupressure wrist bands)    If you are unable to keep anything down and constantly vomiting for more that 24 hours, let the office know so that dehydration can be avoided. We would recommend being seen in the emergency department if this is the case.       Topic  General Pregnancy Information Recommended   (Unless Otherwise Contraindicated Or Restrictions Given To You By Your OB Doctor)      1. Anticipated course of prenatal care      Visits: will be Every 4 wks until 28 weeks, then every 2 weeks until 36 weeks, and then weekly until delivery.   Urine will be collected at each Obstetric visit        2. Nutrition and weight gain    Daily pre-jillian vitamin (recommend taking at night)   Additional 300 calories needed daily  No Sushi, hotdogs, unpasteurized products (milk/cheeses). No large fish such as: shark, yesenia mackerel, tile, sword fish   Incorporate 12 ounces of smaller seafood/week and no more than 6oz of albacore tuna   Caffeine: 200 mg/day or 2 cups of caffeine/day   Weight gain recommendations are based off of BMI before pregnancy. Generally patients who with normal weight prior  pregnancy it is recommended 25-35 pounds of weight gain during the pregnancy with an estimated weekly gain of 1 pound/wk in 2nd and 3rd Trimester.    3. Toxoplasmosis precautions  If cats are in the home avoid changing litter boxes and if you need to change the litter box recommended you use gloves   4. Sexual Activity  Sexual activity is okay unless you are put on restrictions by your provider. I recommend urinating after intercourse    5. Exercise  Generally pre-pregnancy routine is okay to continue   Drink plenty fluids for hydration   Stop any activity that causes heavy cramping like a period or bright red bleeding and contact your provider  No extreme or contact sports   No exercise on your back for an extended period of time after 20 weeks    6. Hot Tub/Saunas  Avoid hot tubes and saunas    7. Hair Treatments  Because of the lack of scientific studies on the effects of chemical treatments on your hair, we must advise that you do it at your own risk. If you choose to treat your hair, we recommend that you wait until after 12 weeks gestation. At this time there is no reason to believe that normal hair treatment is associated with onsequences to the baby.    8. Vaccines  Influenza vaccine is recommended by CDC during flu season   Tdap (pertussis or whopping cough) recommended each pregnancy between 27 and 36 weeks   Tdap booster recommended for family and other planned direct caregivers    9. Water  Water is an important nutrient in a good diet. However, it cannot be stressed enough that during pregnancy water is essential. The body has increased circulation through blood vessels, and without a large increase in water, pregnant women will be dehydrated. It plays an important role in decreasing constipation, preventing  contractions, decreasing swelling, and preventing dizziness. We recommend that you drink 8-10 glasses of water per day.    10. Smoking/Alcohol/Illicit Drug Use  No safe Level   Can lead to  problems with pregnancy   Growth of the developing fetus    labor (delivery before 37 weeks)    rupture of the membranes (water breaking before 37 weeks)   Premature separation of the placenta (which may cause bleeding)   American College of Obstetricians and Gynecologists endorses abstinence   Can lead to babies with disabilities    11. Environmental or work hazards  Unless otherwise restricted you may continue work throughout the pregnancy   Notify your provider of any work hazards or chemical exposure concerns   12. Travel    Safe to travel up to 35 weeks   Continue to wear a seatbelt and airbags are still recommended   Drink plenty fluids   Blood clots are a concern during pregnancy with long travel. Recommend compression stockings and moving around at least every 2 hours and staying hydrated.    13. Use of medications, vitamins, herbs, OTC drugs    Any medications not on the list provided to you from our clinic or given to you by one of our providers we recommend calling to make sure the medication is safe for you and baby.    14. Domestic Violence    Please notify office immediately of any concerns or violence so that we can help direct you to assistance needed   [place] [employer]: [phone]    15. Childbirth classes    List of Childbirth classes from [facility] is available    16. Selecting a Pediatrician  Selecting a pediatrician before delivery is recommended  You can interview pediatricians before delivery    17. Fetal Monitoring    A simple test of your babys well-being is a kick count. After 26 weeks, fetal motion of any kind should be monitored. Further discussion at that time   18.  Labor Signs    Water break, leaking fluids from Vagina prior 37 weeks  Regular contractions, Contractions that are more than 5-6/hour, getting stronger and painful with lower back pain, does not go away with rest and fluids    19. Postpartum Family Planning    Multiple  options available from short term methods to long term reversible and irreversible methods   Discuss with provider as you get closer to delivery    20. Dental    It is recommended that you get an annual dental cleaning    21. Breastfeeding    Classes offered at [facility] and it is recommended to take a class    22. Lifting In 2013, the National [place] for Occupational Safety and Health (NIOSH) published clinical guidelines for occupational lifting in uncomplicated pregnancies. The recommended weight limits are based on gestational age, intermittent versus repetitive lifting, time (hours/day) spent lifting, and lifting height from floor and distance in 3 front of body. In this guideline, the maximum permissible weight for a woman less than 20 weeks of gestation performing infrequent lifting is 36 pounds (16 kgs) and the maximum permissible weight at ?20 weeks is 26 pounds (12 kgs). For repetitive lifting ?1 hour/day, the maximum weights in the first and second half of pregnancy are 18 pounds (8 kgs) and 13 pounds (6 kgs), respectively, and for repetitive lifting <1 hour/day, the maximum weights are 30 pounds (14 kgs) and 22 pounds (10 kgs), respectively. Although not based on high quality evidence, these guidelines are a reasonable reference for counseling pregnant women     23. Scheduling and Provider Availability    Your Obstetric Doctor is usually here weekly but not every day. We recommend you make 3-4 advanced appointments at a time to accommodate your personal needs and work/school obligations.   We ask that you come 15 minutes prior your scheduled appointment.   For same day appointments (not routine appointments) there is a Nurse Practitioner or another obstetric provider available. Please let the  aware you are an OB patient requesting a same day appointment.      24. Recommended Phone [facility]      MEDICATIONS IN PREGNANCY     While some medications are considered safe to take  during pregnancy, the effects of other medications on your unborn baby are unknown. Therefore, it is very important to pay special attention to medications you take while you are pregnant.   If you were taking prescription medications before you became pregnant, please ask your health care provider about continuing these medications as soon as you find out that you are pregnant. Do not stop taking any medications without discussing with your health care provider. Your health care provider will weigh the benefits and risks to your pregnancy when making his or her recommendation. With some medications, the risk of not taking them may be more serious than the potential risk of taking them.   If you are prescribed any new medication, please inform your health care provider that you are pregnant. Be sure to discuss the risks and benefits of the newly prescribed medication with your health care provider before taking the medication. We are always available to answer any questions about new medications and how they relate to your pregnancy.     Are Alternative Pregnancy Medicine Therapies Safe?   Many pregnant women believe natural products can be safely used to relieve nausea, backache, and other annoying symptoms of pregnancy, but many of these so-called natural products have not been tested for their safety and effectiveness. Therefore, it is very important to check with your health care provider before taking any alternative therapies. She will not recommend a product or therapy until it is shown to be safe and effective.     Which Over the Counter Drugs Are Safe?   Prenatal vitamins, now available without a prescription, are safe and important to take during pregnancy. Ask your health care provider about the safety of taking other vitamins, herbal remedies and supplements during pregnancy. Most herbal preparations and supplements have not been proven to be safe during pregnancy. Generally, you should not take any  over-the-counter medication unless it is necessary. The following medications and home remedies have no known harmful effects during pregnancy when taken according to the package directions. If you want to know about the safety of any other medications not listed here, please contact your health care provider.      Problem:  Safe to Take:    Pain relief, headache, and fever  Acetaminophen - Tylenol, Anacin Aspirin-Free    Heartburn  Acid neutralizers - Maalox, Mylanta, Rolaids, Tums, Gaviscon   Histamine-blockers - Pepcid, Zantac, Prilosec    Gas pains and bloating  Simethicone - Gas-X, Maalox Anti-Gas, Mylanta Gas, Mylicon    Nausea  Kristen - beverages, tablets, candies   Vitamin B6   Emetrol (if not diabetic)   Sea bands   Anti-histamines - Sleep-elif, Benadryl, Bonnine, Dramamine    Cough  Guaifenesin (expectorant) - Hytuss, Mucinex, Robitussin   Dextromethorphan (antitussive) - Benylin, Delsym, Scot-Tussin DM   Guaifenesin plus dextromethorphan - Benylin Expectorant, Robitussin DM    Congestion  Pseudoephedrine - Sudafed, Actifed, Dristan, Neosynephrine   Vicks VapoRub   Saline nasal drops or spray    Sore throat  Throat lozenges - Sucrets, Cepacol, Cepastat, Ricola   Chloroseptic Spray   Warm salt/water gargle    Allergy relief  Chlorpheniramine - Chlor-Trimeton, Triaminic   Loratadine - Alavert, Claritin, Tavist ND, Triaminic Allerchews   Cetirizine - Zyrtec   Diphenhydramine -Benadryl, Diphenhist    Rashes  Hydrocortisone cream or ointment   Caladryl lotion or cream   Benadryl cream   Oatmeal bath (Aveeno)    Diarrhea  Loperamide - Imodium, Kaopectate, Maalox Anti-Diarrheal, Pepto Bismol    Constipation  Fiber supplements - Metamucil, Citrucel, Fiberall/Fibercon, Benefiber   Stool softeners - Colace, Senekot, Dulcolax   Milk of Magnesia    Hemorrhoids  Warm baths   Witch hazel preparations - Tucks medicated pads   Steroid preparations - Anusol-HC, Preparation H    Insomnia  Diphenhydramine - Benadryl, Unisom  SleepGels, Nytol, Sominex   Doxylamine succinate - Unisom Nighttime Sleep-Aid    First-aid ointments  Cortaid, Lanacort, Polysporin, Bacitracin, Neosporin    Yeast infections  Call office for appointment      Connected MOM   Using Wireless Technology to Manage Your Prenatal Health   Congratulations! Your team here at [facility] is excited for the upcoming addition to your family and is ready to support you over the course of your pregnancy. One of the ways we are prepared to help you is through our exciting new Digital Medicine Program, Connected MOM. Connected MOM stands for Connected Maternity Online Monitoring and is offered free of charge as a way to help our expectant mothers manage their pregnancy with fewer visits to the obstetrician.    In the fast-paced environment we live in, patients demand convenient, reliable access to healthcare at their fingertips. Recommended by The American College of Obstetricians and Gynecologists (ACOG), the standard prenatal care model for low-risk pregnancies can average anywhere between 12-14 in-office prenatal visits.    Through this innovative program, participating mothers-to-be receive a wireless scale, wireless blood pressure cuff and an at-home urine protein test kit. Through the use of a smartphone, patients can easily send their weight, blood pressure readings and urine protein test results digitally in real-time from the comfort of their own homes. Results are sent directly to their [facility] account, the systems online patient portal which connects directly to the patients Electronic Medical Record. A specialized Connected MOM care team - comprised of the patients obstetrician, a dedicated health  and a  - reviews the data and provides medical recommendations as needed. This allows expectant mothers to receive care proactively, wherever they are, while minimizing the need for in-person visits and thus minimizing  disruption to their regular daily activities.    How it Works At the initial prenatal visit, interested patients can work with their obstetrician to sign up for the program. After the appointment, expectant mothers can head to the [employer], a first-of-its-kind retail experience created by [facility] offering the latest in cutting-edge, interactive healthcare technology, to receive a Connected MOM kit containing all of the digital tools needed for remote monitoring. Once enrolled, patients weigh themselves regularly and take their blood pressure once a week. Instead of coming to three office appointments at weeks 20, 30, 37 the patient will have the appointment at home. They will take their blood pressure, weight and perform three at-home urine protein tests at these home visits. Results are directly transmitted into the EMR, and notifications and messages from the care team are communicated via [facility].     TECH SUPPORT [phone]  [email or web address]      Chromosomal testing  The sequential screen is a test done around 11-13 weeks that consists of an ultrasound that measures the nuchal fold (neck thickness) of baby and blood work on the same day. You get a second set of labs done a few weeks later after 15 weeks. Based on all three of these results, they are able to tell you if you are considered to be low risk or high risk regarding neural tube defects and chromosomal abnormalities like Down Syndrome. If you are at all interested, I usually place the order today so we do not miss the window. Someone will give you a phone call in a week or two to schedule this. If you do not want it, just tell them no thank you. This test is typically covered by your insurance and is performed by the Maternal Fetal Medicine (MFM) department here at [facility]. It will not tell you the sex of the baby.     OR     2.  Call [phone] to see about coverage and any out of pocket costs regarding the Prijwikvj54 (MT21) testing.  This is done any day after 11 weeks and is blood work only. It checks for any chromosomal abnormalities like Down Syndrome. You can also find out the sex of the baby if you choose to know. Once you find out coverage and decide to proceed, send either myself or your doctor a message and we can see what date you can do it. It is done at our second floor lab at [facility].

## 2024-05-23 NOTE — PROGRESS NOTES
HISTORY OF PRESENT ILLNESS:    Iqra Carrillo is a 28 y.o. female, ,  Patient's last menstrual period was 2024 (exact date).  for a routine exam complaining of amenorrhea and positive home UPT. First pregnancy for pt, she is healthy besides PCOS - partner has two healthy girls. He is healthy. No NV or VB, mild int cramping. No abn pap, hx of genital HSV takes valtrex prn. Works as a . This is the extent of the patient's complaints at this time.     Past Medical History:   Diagnosis Date    ADHD (attention deficit hyperactivity disorder)     Allergy     Herpes simplex virus (HSV) infection        Past Surgical History:   Procedure Laterality Date    BREAST SURGERY         MEDICATIONS AND ALLERGIES:      Current Outpatient Medications:     valACYclovir (VALTREX) 500 MG tablet, Take 1 tablet (500 mg total) by mouth 2 (two) times daily. for 3 days, Disp: 30 tablet, Rfl: 11    Review of patient's allergies indicates:   Allergen Reactions    Penicillins Hives    Amoxil [amoxicillin] Swelling and Rash       Family History   Problem Relation Name Age of Onset    Cancer Maternal Grandfather          kidney    No Known Problems Father      No Known Problems Mother      No Known Problems Brother      No Known Problems Sister      Asthma Neg Hx      Birth defects Neg Hx      Chromosomal disorder Neg Hx      Diabetes Neg Hx      Early death Neg Hx      Heart disease Neg Hx      Hyperlipidemia Neg Hx      Hypertension Neg Hx      Mental illness Neg Hx      Seizures Neg Hx      Thyroid disease Neg Hx      Other Neg Hx      Melanoma Neg Hx      Psoriasis Neg Hx      Lupus Neg Hx      Breast cancer Neg Hx      Colon cancer Neg Hx      Ovarian cancer Neg Hx         Social History     Socioeconomic History    Marital status:    Tobacco Use    Smoking status: Never    Smokeless tobacco: Never   Substance and Sexual Activity    Alcohol use: Not Currently     Comment: socially    Drug use: No     "Sexual activity: Yes     Partners: Male   Other Topics Concern    Are you pregnant or think you may be? 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  Infection History: + STDs. Denies PID.  Benign History: Denies uterine fibroids. Denies ovarian cysts. Denies endometriosis. Denies other conditions.  Cancer History: Denies cervical cancer. Denies uterine cancer or hyperplasia. Denies ovarian cancer. Denies vulvar cancer or pre-cancer. Denies vaginal cancer or pre-cancer. Denies breast cancer. Denies colon cancer.  Sexual Activity History: Reports currently being sexually active  Menstrual History: None.  Contraception: None    ROS:  GENERAL: No weight changes. No swelling. No fatigue. No fever.  CARDIOVASCULAR: No chest pain. No shortness of breath. No leg cramps.   NEUROLOGICAL: No headaches. No vision changes.  BREASTS: + pain. No lumps. No discharge.  ABDOMEN: No pain. No nausea. No vomiting. No diarrhea. No constipation.  REPRODUCTIVE: No abnormal bleeding. +mild cramping  VULVA: No pain. No lesions. No itching.  VAGINA: No relaxation. No itching. No odor. No discharge. No lesions.  URINARY: No incontinence. No nocturia. No frequency. No dysuria.    /66   Ht 5' 8" (1.727 m)   Wt 84.1 kg (185 lb 6.5 oz)   LMP 03/26/2024 (Exact Date)   BMI 28.19 kg/m²     PE:  Physical Exam:   Constitutional: She appears well-developed and well-nourished. She does not appear ill. No distress.        Pulmonary/Chest: Right breast exhibits no inverted nipple, no mass, no nipple discharge, no skin change, no tenderness and no swelling. Left breast exhibits no inverted nipple, no mass, no nipple discharge, no skin change, no tenderness and no swelling.          Genitourinary:    Urethra, vagina, uterus, right adnexa and left adnexa normal.      Pelvic exam was performed with patient in the lithotomy " position.   The external female genitalia was normal.   Genitalia hair distrobution normal .   There is no rash or lesion on the right labia. There is no rash or lesion on the left labia. Cervix is normal. No rectocele, cystocele or prolapse of vaginal walls in the vagina. Uterus is not tender. Normal urethral meatus.              Neurological: GCS eye subscore is 4. GCS verbal subscore is 5. GCS motor subscore is 6.         PROCEDURES:  Genprobe  Pap- NL      DIAGNOSIS:  Gyn exam  IUP with stated LMP of Patient's last menstrual period was 2024 (exact date).    Indication   ========   Indication: Estimation of Gestational Age     History   ======   Previous Outcomes    1   Para 0     Method   ======   [place] S8, Transabdominal and transvaginal ultrasound examination. View: Good view     Pregnancy   =========   Segal pregnancy. Number of embryos: 1     Dating   ======   LMP on: 3/27/2024   Cycle: irregular cycle   GA by LMP 8 w + 1 d   ROMELIA by LMP: 2025   Ultrasound examination on: 2024   GA by U/S based upon: CRL   GA by U/S 6 w + 6 d   ROMELIA by U/S: 1/10/2025   Assigned: based on ultrasound (CRL), selected on 2024   Assigned GA 6 w + 6 d   Assigned ROMELIA: 1/10/2025     Assessment   ==========   Gestational sac: visualized   Location: intrauterine   Yolk sac: visualized   Amniotic sac: visualized   Embryo: visualized   CRL 8.7 mm 6w 6d [place]   Cardiac activity: present    bpm     Maternal Structures   ===============   Uterus / Cervix   Uterus: Normal   Cervix: Visualized   Approach: Transvaginal   Ovaries / Tubes / Adnexa   Rt ovary: Normal   Rt ovary D1 40 mm   Rt ovary D2 29 mm   Rt ovary D3 37 mm   Rt ovary Vol 22.0 cmÂ³   Rt ovarian corpus luteum: visualized   Rt ovarian corpus luteum D1 18.0 mm   Rt ovarian corpus luteum D2 16.0 mm   Rt ovarian corpus luteum D3 14.0 mm   Lt ovary: Normal   Lt ovary D1 38 mm   Lt ovary D2 19 mm   Lt ovary D3 30 mm   Lt ovary Vol 11.0  cmÂ³   Cul de Sac / Bladder / Kidneys / Other   Free fluid: No free fluid visualized     Impression   =========   A faustin living IUP is identified.   ROMELIA is assigned based on the CRL from today?s study. If other clinical data, such as IVF conception dating or prior ultrasound assignment of ROMELIA differs from the ROMELIA   assigned today, please contact the [facility] department so that this report can be revised to reflect the correct ROMELIA.   Visualized maternal adnexa - normal in appearance.     Recommendation   ==============   Repeat ultrasound study as clinically indicated per primary OB. No further ultrasounds have been scheduled by [facility].       PLAN:Routine prenatal care    MEDICATIONS PRESCRIBED:  PNV    LABS AND TESTS ORDERED:  New Ob Labs      1st TRIMESTER COUNSELING: Discussed all, booklet provided  Common complaints of pregnancy  HIV and other routine prenatal tests including  genetic screening  Risk factors identified by prenatal history  Anticipated course of prenatal care  Nutrition and weight gain counseling  Toxoplasmosis precautions (Cats/Raw Meat)  Sexual activity and exercise  Environmental/Work hazards  Travel  Tobacco (Ask, Advise, Assess, Assist, and Arrange), as well as alcohol and drug use  Use of any medications (Including supplements, Vitamins, Herbs, or OTC Drugs)  Indications for Ultrasound  Domestic violence  Seat belt use  Childbirth classes/Hospital facilities     TERATOLOGY COUNSELING: Discussed options for SS, MT21 and carrier screening. Pt will let us know her desires. Discussed time constraints on SS      FOLLOW-UP for a New Ob Visit in    4  weeks with   -discussed to call clinic or L&D/ER if after hours for pain/bleeding  -discussed otc meds/tx for NV she will let me know if rx needed      -total time on encounter 30 min

## 2024-05-24 LAB
BACTERIA UR CULT: NO GROWTH
C TRACH DNA SPEC QL NAA+PROBE: NOT DETECTED
N GONORRHOEA DNA SPEC QL NAA+PROBE: NOT DETECTED

## 2024-06-21 ENCOUNTER — PATIENT MESSAGE (OUTPATIENT)
Dept: MATERNAL FETAL MEDICINE | Facility: CLINIC | Age: 29
End: 2024-06-21
Payer: COMMERCIAL

## 2024-06-21 ENCOUNTER — INITIAL PRENATAL (OUTPATIENT)
Dept: OBSTETRICS AND GYNECOLOGY | Facility: CLINIC | Age: 29
End: 2024-06-21
Payer: COMMERCIAL

## 2024-06-21 VITALS
SYSTOLIC BLOOD PRESSURE: 114 MMHG | DIASTOLIC BLOOD PRESSURE: 70 MMHG | BODY MASS INDEX: 28.56 KG/M2 | WEIGHT: 187.81 LBS

## 2024-06-21 DIAGNOSIS — Z28.39 SUSCEPTIBLE TO VARICELLA (NON-IMMUNE), CURRENTLY PREGNANT: ICD-10-CM

## 2024-06-21 DIAGNOSIS — O09.899 SUSCEPTIBLE TO VARICELLA (NON-IMMUNE), CURRENTLY PREGNANT: ICD-10-CM

## 2024-06-21 DIAGNOSIS — Z3A.11 11 WEEKS GESTATION OF PREGNANCY: Primary | ICD-10-CM

## 2024-06-21 PROCEDURE — 99999 PR PBB SHADOW E&M-EST. PATIENT-LVL III: CPT | Mod: PBBFAC,,, | Performed by: OBSTETRICS & GYNECOLOGY

## 2024-06-21 NOTE — PROGRESS NOTES
Pregnancy dating, labs, ultrasound reports, prenatal testing, and problem list; prior records and results; and available outside records were reviewed and updated in EMR.  Pertinent findings were noted below.    Reason for Visit   Initial Prenatal Visit    HPI   28 y.o., at 11w0d by Estimated Date of Delivery: 1/10/25    She would like STD testing  Discussed her Varicella NI status    Contractions: No   Bleeding: No   Loss of fluid: No   Fetal movement: No   Nausea: No   Vomiting: No   Headache: No     Exam   /70   Wt 85.2 kg (187 lb 13.3 oz)   LMP 2024 (Exact Date)   BMI 28.56 kg/m²     GENERAL: No acute distress  ABD: Gravid    Assessment and Plan   11 weeks gestation of pregnancy  -     Connected MOM Enrollment  -     [facility] Connected MOM Program Consent Questionnaire  -     US MFM Procedure ([facility])-Future; Future    Susceptible to varicella (non-immune), currently pregnant  -     US MFM Procedure ([facility])-Future; Future      Bleeding and pain precautions   Materna T 21 today     labor and Pain and bleeding precautions given  Follow-up: 4 weeks

## 2024-07-22 ENCOUNTER — ROUTINE PRENATAL (OUTPATIENT)
Dept: OBSTETRICS AND GYNECOLOGY | Facility: CLINIC | Age: 29
End: 2024-07-22
Payer: COMMERCIAL

## 2024-07-22 VITALS
DIASTOLIC BLOOD PRESSURE: 64 MMHG | WEIGHT: 193.13 LBS | SYSTOLIC BLOOD PRESSURE: 116 MMHG | BODY MASS INDEX: 29.36 KG/M2

## 2024-07-22 DIAGNOSIS — B00.9 HERPES: ICD-10-CM

## 2024-07-22 DIAGNOSIS — Z3A.15 15 WEEKS GESTATION OF PREGNANCY: Primary | ICD-10-CM

## 2024-07-22 PROCEDURE — 99999 PR PBB SHADOW E&M-EST. PATIENT-LVL II: CPT | Mod: PBBFAC,,, | Performed by: OBSTETRICS & GYNECOLOGY

## 2024-07-22 PROCEDURE — 0502F SUBSEQUENT PRENATAL CARE: CPT | Mod: CPTII,S$GLB,, | Performed by: OBSTETRICS & GYNECOLOGY

## 2024-07-22 RX ORDER — VALACYCLOVIR HYDROCHLORIDE 500 MG/1
500 TABLET, FILM COATED ORAL 2 TIMES DAILY
Qty: 30 TABLET | Refills: 11 | Status: SHIPPED | OUTPATIENT
Start: 2024-07-22 | End: 2024-07-25

## 2024-07-22 RX ORDER — ASPIRIN 81 MG/1
81 TABLET ORAL DAILY
COMMUNITY
Start: 2024-07-22 | End: 2025-04-28

## 2024-07-22 NOTE — PROGRESS NOTES
Pregnancy dating, labs, ultrasound reports, prenatal testing, and problem list; prior records and results; and available outside records were reviewed and updated in EMR.  Pertinent findings were noted below.    Reason for Visit   Routine Prenatal Visit    HPI   29 y.o., at 15w3d by Estimated Date of Delivery: 1/10/25    She is doing well    Contractions: No   Bleeding: No   Loss of fluid: No   Fetal movement: No   Nausea: No   Vomiting: No   Headache: No     Exam   /64   Wt 87.6 kg (193 lb 2 oz)   LMP 2024 (Exact Date)   BMI 29.36 kg/m²     GENERAL: No acute distress  ABD: Gravid    Assessment and Plan   15 weeks gestation of pregnancy  -     aspirin (ECOTRIN) 81 MG EC tablet; Take 1 tablet (81 mg total) by mouth once daily.    Herpes  -     valACYclovir (VALTREX) 500 MG tablet; Take 1 tablet (500 mg total) by mouth 2 (two) times daily. for 3 days  Dispense: 30 tablet; Refill: 11      She is has oral hsv and wants valtrex Rx  Materna T 21 XY     labor and Pain and bleeding precautions given  Follow-up: 4 weeks

## 2024-07-26 ENCOUNTER — PATIENT MESSAGE (OUTPATIENT)
Dept: OTHER | Facility: OTHER | Age: 29
End: 2024-07-26
Payer: COMMERCIAL

## 2024-08-02 ENCOUNTER — PATIENT MESSAGE (OUTPATIENT)
Dept: OTHER | Facility: OTHER | Age: 29
End: 2024-08-02
Payer: COMMERCIAL

## 2024-08-16 ENCOUNTER — PROCEDURE VISIT (OUTPATIENT)
Dept: MATERNAL FETAL MEDICINE | Facility: CLINIC | Age: 29
End: 2024-08-16
Payer: COMMERCIAL

## 2024-08-16 ENCOUNTER — PATIENT MESSAGE (OUTPATIENT)
Dept: ADMINISTRATIVE | Facility: OTHER | Age: 29
End: 2024-08-16
Payer: COMMERCIAL

## 2024-08-16 DIAGNOSIS — Z28.39 SUSCEPTIBLE TO VARICELLA (NON-IMMUNE), CURRENTLY PREGNANT: ICD-10-CM

## 2024-08-16 DIAGNOSIS — O09.899 SUSCEPTIBLE TO VARICELLA (NON-IMMUNE), CURRENTLY PREGNANT: ICD-10-CM

## 2024-08-16 DIAGNOSIS — Z3A.11 11 WEEKS GESTATION OF PREGNANCY: ICD-10-CM

## 2024-08-16 PROCEDURE — 76805 OB US >/= 14 WKS SNGL FETUS: CPT | Mod: S$GLB,,, | Performed by: OBSTETRICS & GYNECOLOGY

## 2024-08-23 ENCOUNTER — PATIENT MESSAGE (OUTPATIENT)
Dept: OTHER | Facility: OTHER | Age: 29
End: 2024-08-23
Payer: COMMERCIAL

## 2024-08-27 ENCOUNTER — ROUTINE PRENATAL (OUTPATIENT)
Dept: OBSTETRICS AND GYNECOLOGY | Facility: CLINIC | Age: 29
End: 2024-08-27
Payer: COMMERCIAL

## 2024-08-27 VITALS — WEIGHT: 196 LBS | SYSTOLIC BLOOD PRESSURE: 118 MMHG | BODY MASS INDEX: 29.8 KG/M2 | DIASTOLIC BLOOD PRESSURE: 60 MMHG

## 2024-08-27 DIAGNOSIS — Z3A.20 20 WEEKS GESTATION OF PREGNANCY: Primary | ICD-10-CM

## 2024-08-27 PROCEDURE — 0502F SUBSEQUENT PRENATAL CARE: CPT | Mod: CPTII,S$GLB,, | Performed by: OBSTETRICS & GYNECOLOGY

## 2024-08-27 PROCEDURE — 99999 PR PBB SHADOW E&M-EST. PATIENT-LVL III: CPT | Mod: PBBFAC,,, | Performed by: OBSTETRICS & GYNECOLOGY

## 2024-08-27 NOTE — PROGRESS NOTES
Pregnancy dating, labs, ultrasound reports, prenatal testing, and problem list; prior records and results; and available outside records were reviewed and updated in EMR.  Pertinent findings were noted below.    Reason for Visit   Routine Prenatal Visit    HPI   29 y.o., at 20w4d by Estimated Date of Delivery: 1/10/25    She is doing well with no issues    Contractions: No   Bleeding: No   Loss of fluid: No   Fetal movement: Yes   Nausea: No   Vomiting: No   Headache: No     Exam   /60   Wt 88.9 kg (195 lb 15.8 oz)   LMP 2024 (Exact Date)   BMI 29.80 kg/m²     GENERAL: No acute distress  ABD: Gravid    Assessment and Plan   20 weeks gestation of pregnancy  -     OB Glucose Screen; Future; Expected date: 2024  -     CBC Auto Differential; Future; Expected date: 2024      Discussed breast feeding     labor and Pain and bleeding precautions given  Follow-up: 4 weeks

## 2024-09-20 ENCOUNTER — ROUTINE PRENATAL (OUTPATIENT)
Dept: OBSTETRICS AND GYNECOLOGY | Facility: CLINIC | Age: 29
End: 2024-09-20
Payer: COMMERCIAL

## 2024-09-20 ENCOUNTER — PATIENT MESSAGE (OUTPATIENT)
Dept: OTHER | Facility: OTHER | Age: 29
End: 2024-09-20
Payer: COMMERCIAL

## 2024-09-20 ENCOUNTER — LAB VISIT (OUTPATIENT)
Dept: LAB | Facility: HOSPITAL | Age: 29
End: 2024-09-20
Attending: OBSTETRICS & GYNECOLOGY
Payer: COMMERCIAL

## 2024-09-20 VITALS — WEIGHT: 203.5 LBS | SYSTOLIC BLOOD PRESSURE: 118 MMHG | BODY MASS INDEX: 30.94 KG/M2 | DIASTOLIC BLOOD PRESSURE: 60 MMHG

## 2024-09-20 DIAGNOSIS — Z3A.24 24 WEEKS GESTATION OF PREGNANCY: Primary | ICD-10-CM

## 2024-09-20 DIAGNOSIS — Z3A.20 20 WEEKS GESTATION OF PREGNANCY: ICD-10-CM

## 2024-09-20 LAB
BASOPHILS # BLD AUTO: 0.03 K/UL (ref 0–0.2)
BASOPHILS NFR BLD: 0.3 % (ref 0–1.9)
DIFFERENTIAL METHOD BLD: ABNORMAL
EOSINOPHIL # BLD AUTO: 0.1 K/UL (ref 0–0.5)
EOSINOPHIL NFR BLD: 0.6 % (ref 0–8)
ERYTHROCYTE [DISTWIDTH] IN BLOOD BY AUTOMATED COUNT: 13.3 % (ref 11.5–14.5)
GLUCOSE SERPL-MCNC: 144 MG/DL (ref 70–140)
HCT VFR BLD AUTO: 38.2 % (ref 37–48.5)
HGB BLD-MCNC: 11.8 G/DL (ref 12–16)
IMM GRANULOCYTES # BLD AUTO: 0.06 K/UL (ref 0–0.04)
IMM GRANULOCYTES NFR BLD AUTO: 0.6 % (ref 0–0.5)
LYMPHOCYTES # BLD AUTO: 1.5 K/UL (ref 1–4.8)
LYMPHOCYTES NFR BLD: 15.4 % (ref 18–48)
MCH RBC QN AUTO: 28.4 PG (ref 27–31)
MCHC RBC AUTO-ENTMCNC: 30.9 G/DL (ref 32–36)
MCV RBC AUTO: 92 FL (ref 82–98)
MONOCYTES # BLD AUTO: 0.4 K/UL (ref 0.3–1)
MONOCYTES NFR BLD: 3.9 % (ref 4–15)
NEUTROPHILS # BLD AUTO: 7.4 K/UL (ref 1.8–7.7)
NEUTROPHILS NFR BLD: 79.2 % (ref 38–73)
NRBC BLD-RTO: 0 /100 WBC
PLATELET # BLD AUTO: 222 K/UL (ref 150–450)
PMV BLD AUTO: 11.4 FL (ref 9.2–12.9)
RBC # BLD AUTO: 4.15 M/UL (ref 4–5.4)
WBC # BLD AUTO: 9.4 K/UL (ref 3.9–12.7)

## 2024-09-20 PROCEDURE — 85025 COMPLETE CBC W/AUTO DIFF WBC: CPT | Performed by: OBSTETRICS & GYNECOLOGY

## 2024-09-20 PROCEDURE — 99999 PR PBB SHADOW E&M-EST. PATIENT-LVL III: CPT | Mod: PBBFAC,,, | Performed by: OBSTETRICS & GYNECOLOGY

## 2024-09-20 PROCEDURE — 82950 GLUCOSE TEST: CPT | Performed by: OBSTETRICS & GYNECOLOGY

## 2024-09-20 PROCEDURE — 36415 COLL VENOUS BLD VENIPUNCTURE: CPT | Mod: PN | Performed by: OBSTETRICS & GYNECOLOGY

## 2024-09-20 NOTE — PROGRESS NOTES
Pregnancy dating, labs, ultrasound reports, prenatal testing, and problem list; prior records and results; and available outside records were reviewed and updated in EMR.  Pertinent findings were noted below.    Reason for Visit   Routine Prenatal Visit    HPI   29 y.o., at 24w0d by Estimated Date of Delivery: 1/10/25    She is doing well    Contractions: No   Bleeding: No   Loss of fluid: No   Fetal movement: Yes   Nausea: No   Vomiting: No   Headache: No     Exam   /60   Wt 92.3 kg (203 lb 7.8 oz)   LMP 2024 (Exact Date)   BMI 30.94 kg/m²     GENERAL: No acute distress  ABD: Gravid    Assessment and Plan   24 weeks gestation of pregnancy      PTL precautions  OB screen CBC today  Flu vaccine     labor and Pain and bleeding precautions given  Follow-up: 4 weeks

## 2024-09-23 ENCOUNTER — TELEPHONE (OUTPATIENT)
Dept: OBSTETRICS AND GYNECOLOGY | Facility: CLINIC | Age: 29
End: 2024-09-23
Payer: COMMERCIAL

## 2024-09-23 DIAGNOSIS — O99.810 ABNORMAL GLUCOSE TOLERANCE AFFECTING PREGNANCY, ANTEPARTUM: Primary | ICD-10-CM

## 2024-09-23 DIAGNOSIS — Z3A.24 24 WEEKS GESTATION OF PREGNANCY: Primary | ICD-10-CM

## 2024-09-23 NOTE — TELEPHONE ENCOUNTER
----- Message from Linus Sterling NP sent at 9/23/2024  3:29 PM CDT -----  Contact: [email or web address] [phone]  Done!  ----- Message -----  From: Amisha Vick MA  Sent: 9/23/2024   2:15 PM CDT  To: Linus Sterling NP    Can you order 3 ht gTT for me please  ----- Message -----  From: Demetra Stinson  Sent: 9/23/2024   1:26 PM CDT  To: Joy [facility]ED[facility] Staff    Patient would like to speak with you about her latest Glucose testing that she have had done.

## 2024-09-27 ENCOUNTER — LAB VISIT (OUTPATIENT)
Dept: LAB | Facility: HOSPITAL | Age: 29
End: 2024-09-27
Attending: OBSTETRICS & GYNECOLOGY
Payer: COMMERCIAL

## 2024-09-27 DIAGNOSIS — Z3A.24 24 WEEKS GESTATION OF PREGNANCY: ICD-10-CM

## 2024-09-27 LAB — GLUCOSE SERPL-MCNC: 135 MG/DL (ref 70–140)

## 2024-09-27 PROCEDURE — 36415 COLL VENOUS BLD VENIPUNCTURE: CPT | Mod: PN | Performed by: OBSTETRICS & GYNECOLOGY

## 2024-09-27 PROCEDURE — 82950 GLUCOSE TEST: CPT | Performed by: OBSTETRICS & GYNECOLOGY

## 2024-10-04 ENCOUNTER — PATIENT MESSAGE (OUTPATIENT)
Dept: OTHER | Facility: OTHER | Age: 29
End: 2024-10-04
Payer: COMMERCIAL

## 2024-10-18 ENCOUNTER — PATIENT MESSAGE (OUTPATIENT)
Dept: OTHER | Facility: OTHER | Age: 29
End: 2024-10-18
Payer: COMMERCIAL

## 2024-10-18 ENCOUNTER — ROUTINE PRENATAL (OUTPATIENT)
Dept: OBSTETRICS AND GYNECOLOGY | Facility: CLINIC | Age: 29
End: 2024-10-18
Payer: COMMERCIAL

## 2024-10-18 VITALS — WEIGHT: 207 LBS | BODY MASS INDEX: 31.48 KG/M2 | SYSTOLIC BLOOD PRESSURE: 116 MMHG | DIASTOLIC BLOOD PRESSURE: 62 MMHG

## 2024-10-18 DIAGNOSIS — Z34.00 SUPERVISION OF NORMAL FIRST PREGNANCY, ANTEPARTUM: Primary | ICD-10-CM

## 2024-10-18 PROCEDURE — 99999 PR PBB SHADOW E&M-EST. PATIENT-LVL III: CPT | Mod: PBBFAC,,, | Performed by: NURSE PRACTITIONER

## 2024-10-18 NOTE — PROGRESS NOTES
Here for routine OB appt at 28w0d, with no complaints.  Reports good FM.  Denies LOF, denies VB, denies contractions.  Reviewed warning signs of Labor and Preeclampsia.  Daily FM counts reinforced.  It's a boy - desires circumcision.   Peds- [facility]. Plans to formula feed.   Discussed flu, Tdap and RSV vaccine.  F/U scheduled 2 weeks

## 2024-10-22 ENCOUNTER — ROUTINE PRENATAL (OUTPATIENT)
Dept: OBSTETRICS AND GYNECOLOGY | Facility: CLINIC | Age: 29
End: 2024-10-22
Payer: COMMERCIAL

## 2024-10-22 ENCOUNTER — TELEPHONE (OUTPATIENT)
Dept: OBSTETRICS AND GYNECOLOGY | Facility: CLINIC | Age: 29
End: 2024-10-22
Payer: COMMERCIAL

## 2024-10-22 ENCOUNTER — HOSPITAL ENCOUNTER (OUTPATIENT)
Dept: PERINATAL CARE | Facility: OTHER | Age: 29
Discharge: HOME | End: 2024-10-22
Attending: OBSTETRICS & GYNECOLOGY
Payer: COMMERCIAL

## 2024-10-22 VITALS
DIASTOLIC BLOOD PRESSURE: 60 MMHG | WEIGHT: 208.13 LBS | BODY MASS INDEX: 31.64 KG/M2 | SYSTOLIC BLOOD PRESSURE: 102 MMHG

## 2024-10-22 DIAGNOSIS — O36.8130 DECREASED FETAL MOVEMENTS IN THIRD TRIMESTER, SINGLE OR UNSPECIFIED FETUS: Primary | ICD-10-CM

## 2024-10-22 DIAGNOSIS — O36.8130 DECREASED FETAL MOVEMENTS IN THIRD TRIMESTER, SINGLE OR UNSPECIFIED FETUS: ICD-10-CM

## 2024-10-22 DIAGNOSIS — Z3A.28 28 WEEKS GESTATION OF PREGNANCY: ICD-10-CM

## 2024-10-22 PROCEDURE — 99999 PR PBB SHADOW E&M-EST. PATIENT-LVL III: CPT | Mod: PBBFAC,,, | Performed by: OBSTETRICS & GYNECOLOGY

## 2024-10-22 PROCEDURE — 59025 FETAL NON-STRESS TEST: CPT | Mod: 26,,, | Performed by: STUDENT IN AN ORGANIZED HEALTH CARE EDUCATION/TRAINING PROGRAM

## 2024-10-22 PROCEDURE — 0502F SUBSEQUENT PRENATAL CARE: CPT | Mod: CPTII,S$GLB,, | Performed by: OBSTETRICS & GYNECOLOGY

## 2024-10-22 PROCEDURE — 76815 OB US LIMITED FETUS(S): CPT | Mod: 26,,, | Performed by: STUDENT IN AN ORGANIZED HEALTH CARE EDUCATION/TRAINING PROGRAM

## 2024-10-22 NOTE — TELEPHONE ENCOUNTER
Patient has been scheduled to be seen in office today. Advised patient to come in right now. Patient states that she has a work meeting and will come after it is done, around 11:30AM.

## 2024-10-22 NOTE — PROGRESS NOTES
Pregnancy dating, labs, ultrasound reports, prenatal testing, and problem list; prior records and results; and available outside records were reviewed and updated in EMR.  Pertinent findings were noted below.    Reason for Visit   Routine Prenatal Visit    HPI   29 y.o., at 28w4d by Estimated Date of Delivery: 1/10/25  Decreased FM for a few days  She has not felt the baby move much in the last 24 hours      Contractions: No   Bleeding: No   Loss of fluid: No   Fetal movement: Yes   Nausea: No   Vomiting: No   Headache: No     Exam   /60   Wt 94.4 kg (208 lb 1.8 oz)   LMP 2024 (Exact Date)   BMI 31.64 kg/m²     GENERAL: No acute distress  ABD: Gravid    Assessment and Plan   Decreased fetal movements in third trimester, single or unspecified fetus  -     Prenatal Testing- [facility]    28 weeks gestation of pregnancy      Precautions given     labor and fetal movement count precautions given  Follow-up: 2 weeks

## 2024-10-22 NOTE — TELEPHONE ENCOUNTER
----- Message from Jorje sent at 10/22/2024  8:43 AM CDT -----  Regarding: Not Feeling Kicking with Baby? Medical Advice  Contact: Pt [phone]  [email or web address]     Patient is calling for Medical Advice regarding: Patient called and reported that she is worried because she is not feeling her baby kick as much as she used to feel it. She is 28 weeks and four days; she said the baby was kicking last week and now is not doing it as often and she has severe anxiety over this. Please call back ASAP.    How long has patient had these symptoms: A week    Pharmacy name and phone#:    Patient wants a call back or thru [facility]: Call    Comments:    Please advise patient replies from provider may take up to 48 hours.

## 2024-10-31 ENCOUNTER — ROUTINE PRENATAL (OUTPATIENT)
Dept: OBSTETRICS AND GYNECOLOGY | Facility: CLINIC | Age: 29
End: 2024-10-31
Payer: COMMERCIAL

## 2024-10-31 VITALS
DIASTOLIC BLOOD PRESSURE: 82 MMHG | SYSTOLIC BLOOD PRESSURE: 110 MMHG | BODY MASS INDEX: 32.05 KG/M2 | WEIGHT: 210.75 LBS

## 2024-10-31 DIAGNOSIS — Z3A.29 29 WEEKS GESTATION OF PREGNANCY: Primary | ICD-10-CM

## 2024-10-31 PROCEDURE — 0502F SUBSEQUENT PRENATAL CARE: CPT | Mod: CPTII,S$GLB,, | Performed by: OBSTETRICS & GYNECOLOGY

## 2024-10-31 PROCEDURE — 99999 PR PBB SHADOW E&M-EST. PATIENT-LVL III: CPT | Mod: PBBFAC,,, | Performed by: OBSTETRICS & GYNECOLOGY

## 2024-10-31 NOTE — PROGRESS NOTES
Pregnancy dating, labs, ultrasound reports, prenatal testing, and problem list; prior records and results; and available outside records were reviewed and updated in EMR.  Pertinent findings were noted below.    Reason for Visit   Routine Prenatal Visit    HPI   29 y.o., at 30w2d by Estimated Date of Delivery: 1/10/25        Contractions: No   Bleeding: No   Loss of fluid: No   Fetal movement: Yes   Nausea: No   Vomiting: No   Headache: No     Exam   /82   Wt 95.6 kg (210 lb 12.2 oz)   LMP 2024 (Exact Date)   BMI 32.05 kg/m²     GENERAL: No acute distress  ABD: Gravid    Assessment and Plan   29 weeks gestation of pregnancy           labor and Pain and bleeding precautions given  Follow-up: 2 weeks

## 2024-11-01 ENCOUNTER — PATIENT MESSAGE (OUTPATIENT)
Dept: OTHER | Facility: OTHER | Age: 29
End: 2024-11-01
Payer: COMMERCIAL

## 2024-11-15 ENCOUNTER — ROUTINE PRENATAL (OUTPATIENT)
Dept: OBSTETRICS AND GYNECOLOGY | Facility: CLINIC | Age: 29
End: 2024-11-15
Payer: COMMERCIAL

## 2024-11-15 ENCOUNTER — PATIENT MESSAGE (OUTPATIENT)
Dept: OTHER | Facility: OTHER | Age: 29
End: 2024-11-15
Payer: COMMERCIAL

## 2024-11-15 VITALS
BODY MASS INDEX: 32.11 KG/M2 | DIASTOLIC BLOOD PRESSURE: 64 MMHG | WEIGHT: 211.19 LBS | SYSTOLIC BLOOD PRESSURE: 122 MMHG

## 2024-11-15 DIAGNOSIS — Z23 NEED FOR DIPHTHERIA-TETANUS-PERTUSSIS (TDAP) VACCINE: Primary | ICD-10-CM

## 2024-11-15 PROCEDURE — 99999 PR PBB SHADOW E&M-EST. PATIENT-LVL III: CPT | Mod: PBBFAC,,, | Performed by: NURSE PRACTITIONER

## 2024-11-15 NOTE — PROGRESS NOTES
Here for routine OB appt at 32w0d, with no complaints.  Reports good FM.  Denies LOF, denies VB, denies contractions.  Reviewed warning signs of Labor and Preeclampsia.  Daily FM counts reinforced.  Tdap today.   F/U scheduled 2 weeks

## 2024-11-29 ENCOUNTER — ROUTINE PRENATAL (OUTPATIENT)
Dept: OBSTETRICS AND GYNECOLOGY | Facility: CLINIC | Age: 29
End: 2024-11-29
Payer: COMMERCIAL

## 2024-11-29 VITALS
DIASTOLIC BLOOD PRESSURE: 76 MMHG | WEIGHT: 215.38 LBS | BODY MASS INDEX: 32.75 KG/M2 | SYSTOLIC BLOOD PRESSURE: 118 MMHG

## 2024-11-29 DIAGNOSIS — Z3A.34 34 WEEKS GESTATION OF PREGNANCY: Primary | ICD-10-CM

## 2024-11-29 PROCEDURE — 99999 PR PBB SHADOW E&M-EST. PATIENT-LVL III: CPT | Mod: PBBFAC,,, | Performed by: OBSTETRICS & GYNECOLOGY

## 2024-11-29 NOTE — PROGRESS NOTES
Pregnancy dating, labs, ultrasound reports, prenatal testing, and problem list; prior records and results; and available outside records were reviewed and updated in EMR.  Pertinent findings were noted below.    Reason for Visit   Routine Prenatal Visit    HPI   29 y.o., at 34w0d by Estimated Date of Delivery: 1/10/25    NO issues today.      Contractions: No   Bleeding: No   Loss of fluid: No   Fetal movement: yes   Nausea: No   Vomiting: No   Headache: No     Exam   /76   Wt 97.7 kg (215 lb 6.2 oz)   LMP 2024 (Exact Date)   BMI 32.75 kg/m²     GENERAL: No acute distress  ABD: Gravid    Assessment and Plan   34 weeks gestation of pregnancy      PTL precautions  Tdap done     labor precautions given  Follow-up: 2 weeks

## 2024-12-06 ENCOUNTER — PATIENT MESSAGE (OUTPATIENT)
Dept: OTHER | Facility: OTHER | Age: 29
End: 2024-12-06
Payer: COMMERCIAL

## 2024-12-13 ENCOUNTER — LAB VISIT (OUTPATIENT)
Dept: LAB | Facility: HOSPITAL | Age: 29
End: 2024-12-13
Attending: OBSTETRICS & GYNECOLOGY
Payer: COMMERCIAL

## 2024-12-13 ENCOUNTER — ROUTINE PRENATAL (OUTPATIENT)
Dept: OBSTETRICS AND GYNECOLOGY | Facility: CLINIC | Age: 29
End: 2024-12-13
Payer: COMMERCIAL

## 2024-12-13 VITALS
SYSTOLIC BLOOD PRESSURE: 118 MMHG | WEIGHT: 217.81 LBS | BODY MASS INDEX: 33.12 KG/M2 | DIASTOLIC BLOOD PRESSURE: 70 MMHG

## 2024-12-13 DIAGNOSIS — Z3A.36 36 WEEKS GESTATION OF PREGNANCY: ICD-10-CM

## 2024-12-13 DIAGNOSIS — B00.9 HERPES: ICD-10-CM

## 2024-12-13 DIAGNOSIS — Z3A.36 36 WEEKS GESTATION OF PREGNANCY: Primary | ICD-10-CM

## 2024-12-13 LAB
BASOPHILS # BLD AUTO: 0.03 K/UL (ref 0–0.2)
BASOPHILS NFR BLD: 0.3 % (ref 0–1.9)
DIFFERENTIAL METHOD BLD: ABNORMAL
EOSINOPHIL # BLD AUTO: 0.1 K/UL (ref 0–0.5)
EOSINOPHIL NFR BLD: 1 % (ref 0–8)
ERYTHROCYTE [DISTWIDTH] IN BLOOD BY AUTOMATED COUNT: 14.2 % (ref 11.5–14.5)
HCT VFR BLD AUTO: 39.6 % (ref 37–48.5)
HGB BLD-MCNC: 12.3 G/DL (ref 12–16)
HIV 1+2 AB+HIV1 P24 AG SERPL QL IA: NORMAL
IMM GRANULOCYTES # BLD AUTO: 0.04 K/UL (ref 0–0.04)
IMM GRANULOCYTES NFR BLD AUTO: 0.4 % (ref 0–0.5)
LYMPHOCYTES # BLD AUTO: 2 K/UL (ref 1–4.8)
LYMPHOCYTES NFR BLD: 19.9 % (ref 18–48)
MCH RBC QN AUTO: 27.8 PG (ref 27–31)
MCHC RBC AUTO-ENTMCNC: 31.1 G/DL (ref 32–36)
MCV RBC AUTO: 89 FL (ref 82–98)
MONOCYTES # BLD AUTO: 0.8 K/UL (ref 0.3–1)
MONOCYTES NFR BLD: 7.4 % (ref 4–15)
NEUTROPHILS # BLD AUTO: 7.2 K/UL (ref 1.8–7.7)
NEUTROPHILS NFR BLD: 71 % (ref 38–73)
NRBC BLD-RTO: 0 /100 WBC
PLATELET # BLD AUTO: 271 K/UL (ref 150–450)
PMV BLD AUTO: 11.3 FL (ref 9.2–12.9)
RBC # BLD AUTO: 4.43 M/UL (ref 4–5.4)
TREPONEMA PALLIDUM IGG+IGM AB [PRESENCE] IN SERUM OR PLASMA BY IMMUNOASSAY: NONREACTIVE
WBC # BLD AUTO: 10.09 K/UL (ref 3.9–12.7)

## 2024-12-13 PROCEDURE — 86593 SYPHILIS TEST NON-TREP QUANT: CPT | Performed by: OBSTETRICS & GYNECOLOGY

## 2024-12-13 PROCEDURE — 87389 HIV-1 AG W/HIV-1&-2 AB AG IA: CPT | Performed by: OBSTETRICS & GYNECOLOGY

## 2024-12-13 PROCEDURE — 85025 COMPLETE CBC W/AUTO DIFF WBC: CPT | Performed by: OBSTETRICS & GYNECOLOGY

## 2024-12-13 PROCEDURE — 99999 PR PBB SHADOW E&M-EST. PATIENT-LVL III: CPT | Mod: PBBFAC,,, | Performed by: OBSTETRICS & GYNECOLOGY

## 2024-12-13 PROCEDURE — 36415 COLL VENOUS BLD VENIPUNCTURE: CPT | Mod: PN | Performed by: OBSTETRICS & GYNECOLOGY

## 2024-12-13 PROCEDURE — 87653 STREP B DNA AMP PROBE: CPT | Performed by: OBSTETRICS & GYNECOLOGY

## 2024-12-14 LAB — GROUP B STREPTOCOCCUS, PCR: POSITIVE

## 2024-12-19 RX ORDER — VALACYCLOVIR HYDROCHLORIDE 500 MG/1
500 TABLET, FILM COATED ORAL 2 TIMES DAILY
Qty: 6 TABLET | Refills: 0 | Status: SHIPPED | OUTPATIENT
Start: 2024-12-19 | End: 2024-12-22

## 2024-12-19 NOTE — PROGRESS NOTES
Pregnancy dating, labs, ultrasound reports, prenatal testing, and problem list; prior records and results; and available outside records were reviewed and updated in EMR.  Pertinent findings were noted below.    Reason for Visit   Routine Prenatal Visit    HPI   29 y.o., at 36w6d by Estimated Date of Delivery: 1/10/25    She is doing well    Contractions: No   Bleeding: NO   Loss of fluid: No   Fetal movement: YES   Nausea: No   Vomiting: No   Headache: No     Exam   /70   Wt 98.8 kg (217 lb 13 oz)   LMP 2024 (Exact Date)   BMI 33.12 kg/m²     GENERAL: No acute distress  ABD: Gravid    Assessment and Plan   36 weeks gestation of pregnancy  -     Group B Streptococcus, PCR  -     CBC Auto Differential; Future; Expected date: 2024  -     HIV 1/2 Ag/Ab (4th Gen); Future; Expected date: 2024  -     Treponema Pallidium Antibodies IgG, IgM; Future; Expected date: 2024    Herpes  -     valACYclovir (VALTREX) 500 MG tablet; Take 1 tablet (500 mg total) by mouth 2 (two) times daily. for 3 days  Dispense: 6 tablet; Refill: 0      [facility] BID     labor and Pain and bleeding precautions given  Follow-up: 1 week

## 2024-12-20 ENCOUNTER — ROUTINE PRENATAL (OUTPATIENT)
Dept: OBSTETRICS AND GYNECOLOGY | Facility: CLINIC | Age: 29
End: 2024-12-20
Payer: COMMERCIAL

## 2024-12-20 VITALS — BODY MASS INDEX: 33.45 KG/M2 | WEIGHT: 220 LBS | DIASTOLIC BLOOD PRESSURE: 74 MMHG | SYSTOLIC BLOOD PRESSURE: 108 MMHG

## 2024-12-20 DIAGNOSIS — Z3A.37 37 WEEKS GESTATION OF PREGNANCY: Primary | ICD-10-CM

## 2024-12-20 PROCEDURE — 99999 PR PBB SHADOW E&M-EST. PATIENT-LVL III: CPT | Mod: PBBFAC,,, | Performed by: OBSTETRICS & GYNECOLOGY

## 2024-12-20 PROCEDURE — 0502F SUBSEQUENT PRENATAL CARE: CPT | Mod: CPTII,S$GLB,, | Performed by: OBSTETRICS & GYNECOLOGY

## 2024-12-23 NOTE — PROGRESS NOTES
Pregnancy dating, labs, ultrasound reports, prenatal testing, and problem list; prior records and results; and available outside records were reviewed and updated in EMR.  Pertinent findings were noted below.    Reason for Visit   Routine Prenatal Visit    HPI   29 y.o., at 37w2d by Estimated Date of Delivery: 1/10/25        Contractions: No   Bleeding: No   Loss of fluid: No   Fetal movement: Yes   Nausea: No   Vomiting: No   Headache: No     Exam   /74   Wt 99.8 kg (220 lb 0.3 oz)   LMP 2024 (Exact Date)   BMI 33.45 kg/m²     GENERAL: No acute distress  ABD: Gravid    Assessment and Plan   37 weeks gestation of pregnancy      FMC BID     labor and Labor precautions given  Follow-up: 1 week

## 2024-12-27 ENCOUNTER — ROUTINE PRENATAL (OUTPATIENT)
Dept: OBSTETRICS AND GYNECOLOGY | Facility: CLINIC | Age: 29
End: 2024-12-27
Payer: COMMERCIAL

## 2024-12-27 VITALS
BODY MASS INDEX: 33.92 KG/M2 | SYSTOLIC BLOOD PRESSURE: 106 MMHG | WEIGHT: 223.13 LBS | DIASTOLIC BLOOD PRESSURE: 82 MMHG

## 2024-12-27 DIAGNOSIS — Z3A.38 38 WEEKS GESTATION OF PREGNANCY: Primary | ICD-10-CM

## 2024-12-27 PROCEDURE — 99999 PR PBB SHADOW E&M-EST. PATIENT-LVL III: CPT | Mod: PBBFAC,,, | Performed by: OBSTETRICS & GYNECOLOGY

## 2024-12-27 NOTE — PROGRESS NOTES
HISTORY AND PHYSICAL                                                OBSTETRICS          Subjective:       Iqra Carrillo is a 29 y.o.  female with IUP at 38w0d weeks gestation who presents for PNC.    Patient denies contractions, denies vaginal bleeding, denies LOF.   Fetal Movement: normal.    This IUP is complicated by   Oral HSV- On valtrex  +GBS this pregnancy    Review of Systems  ROS:   GENERAL: No fever, chills, fatigability or weight loss.  SKIN: No rashes, itching or changes in color or texture of skin.  HEAD: No headaches or recent head trauma.  EYES: Visual acuity fine. No photophobia, ocular pain or diplopia.  EARS: Denies ear pain, discharge or vertigo.  NOSE: No loss of smell, no epistaxis or postnasal drip.  MOUTH & THROAT: No hoarseness or change in voice. No excessive gum bleeding.  NODES: Denies swollen glands.  CHEST: Denies CORADO, cyanosis, wheezing, cough and sputum production.  CARDIOVASCULAR: Denies chest pain, PND, orthopnea or reduced exercise tolerance.  ABDOMEN: Appetite fine. No weight loss. Denies diarrhea, abdominal pain, hematemesis or blood in stool.  URINARY: No flank pain, dysuria or hematuria.  PERIPHERAL VASCULAR: No claudication or cyanosis.  MUSCULOSKELETAL: No joint stiffness or swelling. Denies back pain.  NEUROLOGIC: No history of seizures, paralysis, alteration of gait or coordination.     PMHx:   Past Medical History:   Diagnosis Date    ADHD (attention deficit hyperactivity disorder)     Allergy     Herpes simplex virus (HSV) infection        PSHx:   Past Surgical History:   Procedure Laterality Date    BREAST SURGERY         All:   Review of patient's allergies indicates:   Allergen Reactions    Penicillins Hives    Amoxil [amoxicillin] Swelling and Rash       Meds: (Not in a hospital admission)      SH:   Social History     Socioeconomic History    Marital status:    Tobacco Use    Smoking status: Never    Smokeless tobacco: Never   Substance and  Sexual Activity    Alcohol use: Not Currently     Comment: socially    Drug use: No    Sexual activity: Yes     Partners: Male     Birth control/protection: None   Other Topics Concern    Are you pregnant or think you may be? 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       FH:   Family History   Problem Relation Name Age of Onset    Cancer Maternal Grandfather          kidney    No Known Problems Father      No Known Problems Mother      No Known Problems Brother      No Known Problems Sister      Asthma Neg Hx      Birth defects Neg Hx      Chromosomal disorder Neg Hx      Diabetes Neg Hx      Early death Neg Hx      Heart disease Neg Hx      Hyperlipidemia Neg Hx      Hypertension Neg Hx      Mental illness Neg Hx      Seizures Neg Hx      Thyroid disease Neg Hx      Other Neg Hx      Melanoma Neg Hx      Psoriasis Neg Hx      Lupus Neg Hx      Breast cancer Neg Hx      Colon cancer Neg Hx      Ovarian cancer Neg Hx         OBHx:   OB History    Para Term  AB Living   1 0 0 0 0 0   SAB IAB Ectopic Multiple Live Births   0 0 0 0 0      # Outcome Date GA Lbr Isak/2nd Weight Sex Type Anes PTL Lv   1 Current                Objective:       /82   Wt 101.2 kg (223 lb 1.7 oz)   LMP 2024 (Exact Date)   BMI 33.92 kg/m²     Vitals:    24 0951   BP: 106/82   Weight: 101.2 kg (223 lb 1.7 oz)       General:   alert, appears stated age and cooperative, no apparent distress   HENT:  normocephalic, atraumatic   Eyes:  extraocular movements and conjunctivae normal   Neck:  supple, range of motion normal, no thyromegaly   Lungs:   no respiratory distress   Heart:   regular rate   Abdomen:  soft, non-tender, non-distended but gravid, no rebound or guarding    Extremities negative edema, negative erythema   FHT: 150s R                 TOCO: irregular   Presentations: cephalic by ultrasound   Cervix:     Dilation: 1cm    Effacement: 50%     Station:  -3    Consistency: medium    Position: middle   Sterile Speculum Exam:       EFW by Leopold's: 8 lbs    Recent Growth Scan:   none    Lab Review  Blood Type O POS  GBBS: positive  Rubella: Immune  RPR: NR  HIV: negative  HepB: negative       Assessment:       38w0d weeks gestation, plans for Spontaneous Labor  Oral HSV- on valtrex    There are no hospital problems to display for this patient.         Plan:      Risks, benefits, alternatives and possible complications have been discussed in detail with the patient.   - Consents signed and to chart  - Admit to Labor and Delivery unit  -    - Epidural per Anesthesia  - Draw CBC, T&S  - Notify Staff  - Recheck in 2-4  hrs or PRN  - EFW   - Maternal pelvis adequate    Post-Partum Hemorrhage risk - low

## 2025-01-03 ENCOUNTER — ROUTINE PRENATAL (OUTPATIENT)
Dept: OBSTETRICS AND GYNECOLOGY | Facility: CLINIC | Age: 30
End: 2025-01-03
Payer: COMMERCIAL

## 2025-01-03 VITALS
SYSTOLIC BLOOD PRESSURE: 122 MMHG | DIASTOLIC BLOOD PRESSURE: 76 MMHG | BODY MASS INDEX: 33.96 KG/M2 | WEIGHT: 223.31 LBS

## 2025-01-03 DIAGNOSIS — Z34.00 SUPERVISION OF NORMAL FIRST PREGNANCY, ANTEPARTUM: Primary | ICD-10-CM

## 2025-01-03 PROCEDURE — 99999 PR PBB SHADOW E&M-EST. PATIENT-LVL III: CPT | Mod: PBBFAC,,, | Performed by: NURSE PRACTITIONER

## 2025-01-03 RX ORDER — CIPROFLOXACIN AND DEXAMETHASONE 3; 1 MG/ML; MG/ML
SUSPENSION/ DROPS AURICULAR (OTIC)
COMMUNITY
Start: 2025-01-02

## 2025-01-03 NOTE — PROGRESS NOTES
Here for routine OB appt at 39w0d, with complaints of swelling to her extremities. Lost mucous plug.  Reports good FM.  Denies LOF, denies VB, denies contractions.  Reviewed warning signs of Labor and Preeclampsia.  Daily FM counts reinforced.  She desires induction- will coordinate scheduling with Dr. Hamilton.   F/U scheduled 1 week

## 2025-01-06 ENCOUNTER — TELEPHONE (OUTPATIENT)
Dept: OBSTETRICS AND GYNECOLOGY | Facility: CLINIC | Age: 30
End: 2025-01-06
Payer: COMMERCIAL

## 2025-01-06 DIAGNOSIS — Z3A.39 39 WEEKS GESTATION OF PREGNANCY: Primary | ICD-10-CM

## 2025-01-06 NOTE — TELEPHONE ENCOUNTER
----- Message from Lucille sent at 1/6/2025  2:18 PM CST -----  Regarding: induction date  Name of Who is Calling: Iqra           What is the request in detail: Patient is requesting a call back to find out if she can be induced 1/9 at 4 am. She was told someone will give her a call but no one has called.            Can the clinic reply by [facility]: Yes           [facility]: [phone]

## 2025-01-08 ENCOUNTER — PATIENT MESSAGE (OUTPATIENT)
Dept: OBSTETRICS AND GYNECOLOGY | Facility: OTHER | Age: 30
End: 2025-01-08
Payer: COMMERCIAL

## 2025-01-09 ENCOUNTER — HOSPITAL ENCOUNTER (INPATIENT)
Facility: OTHER | Age: 30
LOS: 2 days | Discharge: HOME | End: 2025-01-11
Attending: OBSTETRICS & GYNECOLOGY | Admitting: OBSTETRICS & GYNECOLOGY
Payer: COMMERCIAL

## 2025-01-09 ENCOUNTER — ANESTHESIA (OUTPATIENT)
Dept: OBSTETRICS AND GYNECOLOGY | Facility: OTHER | Age: 30
End: 2025-01-09
Payer: COMMERCIAL

## 2025-01-09 ENCOUNTER — ANESTHESIA EVENT (OUTPATIENT)
Dept: OBSTETRICS AND GYNECOLOGY | Facility: OTHER | Age: 30
End: 2025-01-09
Payer: COMMERCIAL

## 2025-01-09 DIAGNOSIS — Z34.90 ENCOUNTER FOR INDUCTION OF LABOR: ICD-10-CM

## 2025-01-09 DIAGNOSIS — Z3A.39 39 WEEKS GESTATION OF PREGNANCY: ICD-10-CM

## 2025-01-09 PROBLEM — Z88.0 HISTORY OF PENICILLIN ALLERGY: Status: ACTIVE | Noted: 2025-01-09

## 2025-01-09 PROBLEM — O09.899 MATERNAL VARICELLA, NON-IMMUNE: Status: ACTIVE | Noted: 2025-01-09

## 2025-01-09 PROBLEM — A49.1 GBS (GROUP B STREPTOCOCCUS) INFECTION: Status: ACTIVE | Noted: 2025-01-09

## 2025-01-09 PROBLEM — Z28.39 MATERNAL VARICELLA, NON-IMMUNE: Status: ACTIVE | Noted: 2025-01-09

## 2025-01-09 LAB
ABO + RH BLD: NORMAL
ALBUMIN SERPL BCP-MCNC: 1.8 G/DL (ref 3.5–5.2)
ALBUMIN SERPL BCP-MCNC: 3 G/DL (ref 3.5–5.2)
ALP SERPL-CCNC: 123 U/L (ref 40–150)
ALP SERPL-CCNC: 218 U/L (ref 40–150)
ALT SERPL W/O P-5'-P-CCNC: 12 U/L (ref 10–44)
ALT SERPL W/O P-5'-P-CCNC: 9 U/L (ref 10–44)
ANION GAP SERPL CALC-SCNC: 11 MMOL/L (ref 8–16)
ANION GAP SERPL CALC-SCNC: 6 MMOL/L (ref 8–16)
AST SERPL-CCNC: 15 U/L (ref 10–40)
AST SERPL-CCNC: 9 U/L (ref 10–40)
BASOPHILS # BLD AUTO: 0.05 K/UL (ref 0–0.2)
BASOPHILS NFR BLD: 0.5 % (ref 0–1.9)
BILIRUB SERPL-MCNC: 0.2 MG/DL (ref 0.1–1)
BILIRUB SERPL-MCNC: 0.3 MG/DL (ref 0.1–1)
BLD GP AB SCN CELLS X3 SERPL QL: NORMAL
BUN SERPL-MCNC: 10 MG/DL (ref 6–20)
BUN SERPL-MCNC: 7 MG/DL (ref 6–20)
CALCIUM SERPL-MCNC: 5.3 MG/DL (ref 8.7–10.5)
CALCIUM SERPL-MCNC: 9.1 MG/DL (ref 8.7–10.5)
CHLORIDE SERPL-SCNC: 109 MMOL/L (ref 95–110)
CHLORIDE SERPL-SCNC: 125 MMOL/L (ref 95–110)
CO2 SERPL-SCNC: 10 MMOL/L (ref 23–29)
CO2 SERPL-SCNC: 16 MMOL/L (ref 23–29)
CREAT SERPL-MCNC: 0.5 MG/DL (ref 0.5–1.4)
CREAT SERPL-MCNC: 0.7 MG/DL (ref 0.5–1.4)
CREAT UR-MCNC: 189.2 MG/DL (ref 15–325)
DIFFERENTIAL METHOD BLD: ABNORMAL
EOSINOPHIL # BLD AUTO: 0.1 K/UL (ref 0–0.5)
EOSINOPHIL NFR BLD: 1 % (ref 0–8)
ERYTHROCYTE [DISTWIDTH] IN BLOOD BY AUTOMATED COUNT: 14.7 % (ref 11.5–14.5)
EST. GFR  (NO RACE VARIABLE): >60 ML/MIN/1.73 M^2
EST. GFR  (NO RACE VARIABLE): >60 ML/MIN/1.73 M^2
GLUCOSE SERPL-MCNC: 49 MG/DL (ref 70–110)
GLUCOSE SERPL-MCNC: 71 MG/DL (ref 70–110)
HCT VFR BLD AUTO: 36.9 % (ref 37–48.5)
HGB BLD-MCNC: 12.6 G/DL (ref 12–16)
IMM GRANULOCYTES # BLD AUTO: 0.05 K/UL (ref 0–0.04)
IMM GRANULOCYTES NFR BLD AUTO: 0.5 % (ref 0–0.5)
LYMPHOCYTES # BLD AUTO: 2.8 K/UL (ref 1–4.8)
LYMPHOCYTES NFR BLD: 26.7 % (ref 18–48)
MCH RBC QN AUTO: 28.6 PG (ref 27–31)
MCHC RBC AUTO-ENTMCNC: 34.1 G/DL (ref 32–36)
MCV RBC AUTO: 84 FL (ref 82–98)
MONOCYTES # BLD AUTO: 0.5 K/UL (ref 0.3–1)
MONOCYTES NFR BLD: 4.4 % (ref 4–15)
NEUTROPHILS # BLD AUTO: 7 K/UL (ref 1.8–7.7)
NEUTROPHILS NFR BLD: 66.9 % (ref 38–73)
NRBC BLD-RTO: 0 /100 WBC
PLATELET # BLD AUTO: 275 K/UL (ref 150–450)
PMV BLD AUTO: 11 FL (ref 9.2–12.9)
POTASSIUM SERPL-SCNC: 2.6 MMOL/L (ref 3.5–5.1)
POTASSIUM SERPL-SCNC: 4.1 MMOL/L (ref 3.5–5.1)
PROT SERPL-MCNC: 3.5 G/DL (ref 6–8.4)
PROT SERPL-MCNC: 6.6 G/DL (ref 6–8.4)
PROT UR-MCNC: 27 MG/DL (ref 0–15)
PROT/CREAT UR: 0.14 MG/G{CREAT} (ref 0–0.2)
RBC # BLD AUTO: 4.41 M/UL (ref 4–5.4)
SODIUM SERPL-SCNC: 136 MMOL/L (ref 136–145)
SODIUM SERPL-SCNC: 141 MMOL/L (ref 136–145)
SPECIMEN OUTDATE: NORMAL
TREPONEMA PALLIDUM IGG+IGM AB [PRESENCE] IN SERUM OR PLASMA BY IMMUNOASSAY: NONREACTIVE
WBC # BLD AUTO: 10.43 K/UL (ref 3.9–12.7)

## 2025-01-09 PROCEDURE — 59200 INSERT CERVICAL DILATOR: CPT

## 2025-01-09 PROCEDURE — 63600175 PHARM REV CODE 636 W HCPCS

## 2025-01-09 PROCEDURE — 51702 INSERT TEMP BLADDER CATH: CPT

## 2025-01-09 PROCEDURE — 27000181 HC CABLE, IUPC

## 2025-01-09 PROCEDURE — 86593 SYPHILIS TEST NON-TREP QUANT: CPT

## 2025-01-09 PROCEDURE — 11000001 HC ACUTE MED/SURG PRIVATE ROOM

## 2025-01-09 PROCEDURE — 72100002 HC LABOR CARE, 1ST 8 HOURS

## 2025-01-09 PROCEDURE — 84156 ASSAY OF PROTEIN URINE: CPT

## 2025-01-09 PROCEDURE — 62326 NJX INTERLAMINAR LMBR/SAC: CPT

## 2025-01-09 PROCEDURE — 25000003 PHARM REV CODE 250

## 2025-01-09 PROCEDURE — 80053 COMPREHEN METABOLIC PANEL: CPT | Mod: 91

## 2025-01-09 PROCEDURE — 85025 COMPLETE CBC W/AUTO DIFF WBC: CPT

## 2025-01-09 PROCEDURE — 86901 BLOOD TYPING SEROLOGIC RH(D): CPT

## 2025-01-09 PROCEDURE — 59409 OBSTETRICAL CARE: CPT | Mod: AA,,, | Performed by: ANESTHESIOLOGY

## 2025-01-09 PROCEDURE — 80053 COMPREHEN METABOLIC PANEL: CPT

## 2025-01-09 PROCEDURE — 36415 COLL VENOUS BLD VENIPUNCTURE: CPT

## 2025-01-09 PROCEDURE — 10907ZC DRAINAGE OF AMNIOTIC FLUID, THERAPEUTIC FROM PRODUCTS OF CONCEPTION, VIA NATURAL OR ARTIFICIAL OPENING: ICD-10-PCS | Performed by: STUDENT IN AN ORGANIZED HEALTH CARE EDUCATION/TRAINING PROGRAM

## 2025-01-09 RX ORDER — OXYTOCIN-SODIUM CHLORIDE 0.9% IV SOLN 30 UNIT/500ML 30-0.9/5 UT/ML-%
0-40 SOLUTION INTRAVENOUS CONTINUOUS
Status: DISCONTINUED | OUTPATIENT
Start: 2025-01-09 | End: 2025-01-10

## 2025-01-09 RX ORDER — FENTANYL/BUPIVACAINE/NS/PF 2MCG/ML-.1
PLASTIC BAG, INJECTION (ML) INJECTION
Status: COMPLETED
Start: 2025-01-09 | End: 2025-01-09

## 2025-01-09 RX ORDER — CALCIUM CARBONATE 200(500)MG
500 TABLET,CHEWABLE ORAL 3 TIMES DAILY PRN
Status: DISCONTINUED | OUTPATIENT
Start: 2025-01-09 | End: 2025-01-10

## 2025-01-09 RX ORDER — SODIUM CHLORIDE 9 MG/ML
INJECTION, SOLUTION INTRAVENOUS
Status: DISCONTINUED | OUTPATIENT
Start: 2025-01-09 | End: 2025-01-10

## 2025-01-09 RX ORDER — CEFAZOLIN 2 G/1
2 INJECTION, POWDER, FOR SOLUTION INTRAMUSCULAR; INTRAVENOUS ONCE AS NEEDED
Status: DISCONTINUED | OUTPATIENT
Start: 2025-01-09 | End: 2025-01-10

## 2025-01-09 RX ORDER — SODIUM CHLORIDE, SODIUM LACTATE, POTASSIUM CHLORIDE, CALCIUM CHLORIDE 600; 310; 30; 20 MG/100ML; MG/100ML; MG/100ML; MG/100ML
INJECTION, SOLUTION INTRAVENOUS CONTINUOUS
Status: DISCONTINUED | OUTPATIENT
Start: 2025-01-09 | End: 2025-01-10

## 2025-01-09 RX ORDER — LIDOCAINE HYDROCHLORIDE 10 MG/ML
10 INJECTION, SOLUTION INFILTRATION; PERINEURAL ONCE AS NEEDED
Status: DISCONTINUED | OUTPATIENT
Start: 2025-01-09 | End: 2025-01-10

## 2025-01-09 RX ORDER — LIDOCAINE HYDROCHLORIDE AND EPINEPHRINE 15; 5 MG/ML; UG/ML
INJECTION, SOLUTION EPIDURAL
Status: DISCONTINUED | OUTPATIENT
Start: 2025-01-09 | End: 2025-01-10

## 2025-01-09 RX ORDER — OXYTOCIN-SODIUM CHLORIDE 0.9% IV SOLN 30 UNIT/500ML 30-0.9/5 UT/ML-%
10 SOLUTION INTRAVENOUS ONCE AS NEEDED
Status: DISCONTINUED | OUTPATIENT
Start: 2025-01-09 | End: 2025-01-10

## 2025-01-09 RX ORDER — ACETAMINOPHEN 500 MG
1000 TABLET ORAL EVERY 6 HOURS PRN
Status: DISCONTINUED | OUTPATIENT
Start: 2025-01-09 | End: 2025-01-10

## 2025-01-09 RX ORDER — SIMETHICONE 80 MG
1 TABLET,CHEWABLE ORAL 4 TIMES DAILY PRN
Status: DISCONTINUED | OUTPATIENT
Start: 2025-01-09 | End: 2025-01-10

## 2025-01-09 RX ORDER — FENTANYL/BUPIVACAINE/NS/PF 2MCG/ML-.1
PLASTIC BAG, INJECTION (ML) INJECTION
Status: DISCONTINUED | OUTPATIENT
Start: 2025-01-09 | End: 2025-01-10

## 2025-01-09 RX ORDER — ONDANSETRON 8 MG/1
8 TABLET, ORALLY DISINTEGRATING ORAL EVERY 8 HOURS PRN
Status: DISCONTINUED | OUTPATIENT
Start: 2025-01-09 | End: 2025-01-10

## 2025-01-09 RX ADMIN — FENTANYL CITRATE 8 ML/HR: 50 INJECTION INTRAMUSCULAR; INTRAVENOUS at 11:01

## 2025-01-09 RX ADMIN — VANCOMYCIN HYDROCHLORIDE 1500 MG: 1.5 INJECTION, POWDER, LYOPHILIZED, FOR SOLUTION INTRAVENOUS at 06:01

## 2025-01-09 RX ADMIN — LIDOCAINE HYDROCHLORIDE,EPINEPHRINE BITARTRATE 3 ML: 15; .005 INJECTION, SOLUTION EPIDURAL; INFILTRATION; INTRACAUDAL; PERINEURAL at 11:01

## 2025-01-09 RX ADMIN — VANCOMYCIN HYDROCHLORIDE 1500 MG: 1.5 INJECTION, POWDER, LYOPHILIZED, FOR SOLUTION INTRAVENOUS at 02:01

## 2025-01-09 RX ADMIN — OXYTOCIN-SODIUM CHLORIDE 0.9% IV SOLN 30 UNIT/500ML 4 MILLI-UNITS/MIN: 30-0.9/5 SOLUTION at 06:01

## 2025-01-09 RX ADMIN — FENTANYL CITRATE 4 ML: 50 INJECTION INTRAMUSCULAR; INTRAVENOUS at 11:01

## 2025-01-09 NOTE — PROGRESS NOTES
"LABOR NOTE    S:  Complaints: No.  Epidural working:  yes    O: /76   Pulse 107   Temp 97.7 °F (36.5 °C) (Oral)   Resp 12   Ht 5' 7.99" (1.727 m)   Wt 101.3 kg (223 lb 5.2 oz)   LMP 2024 (Exact Date)   SpO2 100%   Breastfeeding No   BMI 33.96 kg/m²     FHT:  Cat 1 (reassuring) 150bpm, moderate variability, + accels, no decels  CTX: q 2-3 min  SVE: /-2    TIMELINE:  0515: 1.560/-3, Rags[place] placed, Pitocin initiated  0915: 3/80/-3, pit @ 20  1115: Ragsdale out, patient desires epidural prior to AROM  1230: /-2, AROM meconium, pit @ 20  1715: /-2, pit @ 28, IUPC placed    ASSESSMENT:   29 y.o.  at 39w6d, IOL  FHT reassuring    PLAN:    Continue Close Maternal/Fetal Monitoring  Pitocin Augmentation per protocol  Recheck 2-4 hours or PRN  Continue vancomycin for GBS in setting of severe PCN allergy    Awa Thompson MD  OB/GYN PGY-1  "

## 2025-01-09 NOTE — ANESTHESIA PREPROCEDURE EVALUATION
[facility]  Anesthesia Pre-Operative Evaluation         Patient Name: Iqra Carrillo  YOB: 1995  MRN: [id number]    2025      Iqra Carrillo is a 29 y.o. female  @ 39w6d who presents for scheduled IOL. Current IUP c/b GBS+ status, oral HSV on valtrex.     No spine disorders, bleeding/clotting abnormalities or cardiopulmonary pathology noted on chart review.     OB History    Para Term  AB Living   1 0 0 0 0 0   SAB IAB Ectopic Multiple Live Births   0 0 0 0 0      # Outcome Date GA Lbr Isak/2nd Weight Sex Type Anes PTL Lv   1 Current                Review of patient's allergies indicates:   Allergen Reactions    Penicillins Hives    Amoxil [amoxicillin] Swelling and Rash       Wt Readings from Last 1 Encounters:   25 1046 101.3 kg (223 lb 5.2 oz)       BP Readings from Last 3 Encounters:   25 122/76   24 106/82   24 108/74       Patient Active Problem List   Diagnosis    Benign neoplasm of scalp and skin of neck    Benign neoplasm of skin of other and unspecified parts of face    Amenorrhea    Genital herpes       Past Surgical History:   Procedure Laterality Date    BREAST SURGERY         Social History     Socioeconomic History    Marital status:    Tobacco Use    Smoking status: Never    Smokeless tobacco: Never   Substance and Sexual Activity    Alcohol use: Not Currently     Comment: socially    Drug use: No    Sexual activity: Yes     Partners: Male     Birth control/protection: None   Other Topics Concern    Are you pregnant or think you may be? 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         Chemistry        Component Value Date/Time     2018 0853    K 3.9 2018 0853     2018 0853    CO2 25 2018 0853    BUN 13 2018 0853    CREATININE 0.9 2018 0853    GLU 82 2018 0853        Component Value  "Date/Time    CALCIUM 9.8 08/01/2018 0853    ALKPHOS 57 08/01/2018 0853    AST 20 08/01/2018 0853    ALT 17 08/01/2018 0853    BILITOT 0.6 08/01/2018 0853    ESTGFRAFRICA >60.0 08/01/2018 0853    EGFRNONAA >60.0 08/01/2018 0853            Lab Results   Component Value Date    WBC 10.09 12/13/2024    HGB 12.3 12/13/2024    HCT 39.6 12/13/2024    MCV 89 12/13/2024     12/13/2024       No results for input(s): "PT", "INR", "PROTIME", "APTT" in the last 72 hours.          Pre-op Assessment    I have reviewed the Patient Summary Reports.     I have reviewed the Nursing Notes. I have reviewed the NPO Status.   I have reviewed the Medications.     Review of Systems  Anesthesia Hx:  No problems with previous Anesthesia               Denies Personal Hx of Anesthesia complications.                    Cardiovascular:  Cardiovascular Normal                                              Pulmonary:  Pulmonary Normal                       Renal/:  Renal/ Normal                 Hepatic/GI:  Hepatic/GI Normal                    Neurological:  Neurology Normal                                          Physical Exam  General: Well nourished, Cooperative, Alert and Oriented    Airway:  Mallampati: III / II  Mouth Opening: Normal  Tongue: Normal  Neck ROM: Normal ROM    Dental:  Intact    Chest/Lungs:  Normal Respiratory Rate    Heart:  Rate: Normal        Anesthesia Plan  Type of Anesthesia, risks & benefits discussed:    Anesthesia Type: Epidural, Spinal, CSE  Intra-op Monitoring Plan: Standard ASA Monitors  Post Op Pain Control Plan: multimodal analgesia and IV/PO Opioids PRN  Induction:  IV  Airway Plan: Direct, Post-Induction  Informed Consent: Informed consent signed with the Patient and all parties understand the risks and agree with anesthesia plan.  All questions answered.   ASA Score: 2  Day of Surgery Review of History & Physical: H&P Update referred to the surgeon/provider.    Ready For Surgery From Anesthesia " Perspective.     .       25-Nov-2024 13:06

## 2025-01-09 NOTE — ED NOTES
29 y.o.  39w6d by 6wk US      Dx: IOL, GBS, VNI, Hx HSV (Neg SSE)  PPHRisk: low  SVE: 1.5/60/-3 @ 0515   Mem: I  US: vtx  Jaime: 8  Meds: Melyssa (515)/Lisa King    BC: ASK  Labs:O POS/RI/HIVneg/Trepnr/GBBSpos      [ ] OB - Joy  [ ] Labs  [ ] Boy- circ consents DONE  [X] Consents

## 2025-01-09 NOTE — INTERVAL H&P NOTE
The patient has been examined and the H&P has been reviewed:    I concur with the findings and no changes have occurred since H&P was written.    Iqra Carrillo is 29 y.o.  at 39w6d wga presenting for induction of labor.     Temp:  [98.3 °F (36.8 °C)] 98.3 °F (36.8 °C)  Pulse:  [] 88  Resp:  [16] 16  SpO2:  [97 %-100 %] 100 %  BP: (120-134)/(63-82) 134/63    FHT: 140 bpm, moderate BTBV, +accels, -decels; Cat 1 (reassuring)  [place]: q 8-10mins  Presentation: cephalic by ultrasound    SVE: 1.5/70/-2    1) Induction of Labor  - Plan for induction with doty/pit    2) HSV  - Neg SSE    Contraception: Undecided     Jessica Nair MD  OB/GYN PGY-3     Active Hospital Problems    Diagnosis  POA    *Encounter for induction of labor [Z34.90]  Not Applicable    Maternal varicella, non-immune [O09.899, Z28.39]  Yes    GBS (group B streptococcus) infection [A49.1]  Yes    History of penicillin allergy [Z88.0]  Not Applicable    Genital herpes [A60.00]  Yes      Resolved Hospital Problems   No resolved problems to display.

## 2025-01-09 NOTE — CARE UPDATE
"LABOR NOTE    S:  Complaints: No.  Epidural working:  not applicable      O: /77   Pulse 91   Temp 97.7 °F (36.5 °C) (Oral)   Resp 12   Ht 5' 7.99" (1.727 m)   Wt 101.3 kg (223 lb 5.2 oz)   LMP 2024 (Exact Date)   SpO2 99%   Breastfeeding No   BMI 33.96 kg/m²       FHT:  Cat 1 (reassuring) 150bpm, moderate btbv, + accels, no decels  CTX: irregular  SVE: 3/80/-3 around the balloon which is still in palce      ASSESSMENT:   29 y.o.  at 39w6d, IOL    FHT reassuring    Active Hospital Problems    Diagnosis  POA    *Encounter for induction of labor [Z34.90]  Not Applicable    Maternal varicella, non-immune [O09.899, Z28.39]  Yes    GBS (group B streptococcus) infection [A49.1]  Yes    History of penicillin allergy [Z88.0]  Not Applicable    Genital herpes [A60.00]  Yes      Resolved Hospital Problems   No resolved problems to display.     LABOR COURSE:  0515: balloon placed  0915: 3/80/-3, balloon in place. Pit @ 20mU    PLAN:    Continue Close Maternal/Fetal Monitoring  Pitocin Augmentation per protocol  Tub on balloon hourly  Recheck 2-4 hours or PRN      Sommer Lundy M.D.      "

## 2025-01-09 NOTE — PROGRESS NOTES
"LABOR NOTE    S:  Complaints: No.  Epidural working:  yes    O: /73   Pulse 92   Temp 97.7 °F (36.5 °C) (Oral)   Resp 12   Ht 5' 7.99" (1.727 m)   Wt 101.3 kg (223 lb 5.2 oz)   LMP 2024 (Exact Date)   SpO2 100%   Breastfeeding No   BMI 33.96 kg/m²     FHT:  Cat 1 (reassuring) 150bpm, moderate variability, + accels, no decels  CTX: q1-2 min, difficulty tracing on toco  SVE: /-2 AROM mec    TIMELINE:  0515: 1.5/60/-3, Ragsdale placed, Pitocin initiated  0915: 3/80/-3, pit @ 20  1115: Ragsdale out, patient desires epidural prior to AROM  1230: /-2, AROM meconium, pit @ 20    ASSESSMENT:   29 y.o.  at 39w6d, IOL    FHT reassuring    Active Hospital Problems    Diagnosis  POA    *Encounter for induction of labor [Z34.90]  Not Applicable    Maternal varicella, non-immune [O09.899, Z28.39]  Yes    GBS (group B streptococcus) infection [A49.1]  Yes    History of penicillin allergy [Z88.0]  Not Applicable    Genital herpes [A60.00]  Yes      Resolved Hospital Problems   No resolved problems to display.     PLAN:    Continue Close Maternal/Fetal Monitoring  Pitocin Augmentation per protocol  Recheck 2-4 hours or PRN  Patient notified about NICU presence at delivery given meconium stained fluid. All questions answered.  Continue vancomycin for GBS in setting of severe PCN allergy    Awa Thompson MD  OB/GYN PGY-1  "

## 2025-01-09 NOTE — ANESTHESIA PROCEDURE NOTES
Epidural    Patient location during procedure: OB   Reason for block: primary anesthetic   Reason for block: labor analgesia requested by patient and obstetrician  Diagnosis: IUP   Start time: 1/9/2025 11:37 AM  Timeout: 1/9/2025 11:36 AM  End time: 1/9/2025 11:42 AM  Surgery related to: Vaginal Delivery    Staffing  Performing Provider: Jamar Montiel DO  Authorizing Provider: Jamila Lamb MD    Staffing  Performed by: Jamar Montiel DO  Authorized by: Jamila Lamb MD        Preanesthetic Checklist  Completed: patient identified, IV checked, site marked, risks and benefits discussed, surgical consent, monitors and equipment checked, pre-op evaluation, timeout performed, anesthesia consent given, hand hygiene performed and patient being monitored  Preparation  Patient position: sitting  Prep: ChloraPrep  Patient monitoring: Pulse Ox  Reason for block: primary anesthetic   Epidural  Skin Anesthetic: lidocaine 1%  Skin Wheal: 3 mL  Administration type: continuous  Approach: midline  Interspace: L3-4    Injection technique: MARIA LUISA air  Needle and Epidural Catheter  Needle type: Tuohy   Needle gauge: 17  Needle length: 3.5 inches  Needle insertion depth: 8 cm  Catheter type: springwound  Catheter size: 19 G  Catheter at skin depth: 12 cm  Insertion Attempts: 1  Test dose: 3 mL of lidocaine 1.5% with Epi 1-to-200,000  Additional Documentation: incremental injection, negative aspiration for heme and CSF, no paresthesia on injection, no signs/symptoms of IV or SA injection, no significant pain on injection and no significant complaints from patient  Needle localization: anatomical landmarks  Medications:  Volume per aspiration: 5 mL  Time between aspirations: 5 minutes   Assessment  Ease of block: easy  Patient's tolerance of the procedure: comfortable throughout block and no complaints No inadvertent dural puncture with Tuohy.  Dural puncture performed with spinal needle.

## 2025-01-10 PROBLEM — A60.00 GENITAL HERPES: Status: RESOLVED | Noted: 2024-05-23 | Resolved: 2025-01-10

## 2025-01-10 PROBLEM — Z34.90 ENCOUNTER FOR INDUCTION OF LABOR: Status: RESOLVED | Noted: 2025-01-09 | Resolved: 2025-01-10

## 2025-01-10 PROBLEM — A49.1 GBS (GROUP B STREPTOCOCCUS) INFECTION: Status: RESOLVED | Noted: 2025-01-09 | Resolved: 2025-01-10

## 2025-01-10 PROBLEM — Z88.0 HISTORY OF PENICILLIN ALLERGY: Status: RESOLVED | Noted: 2025-01-09 | Resolved: 2025-01-10

## 2025-01-10 PROCEDURE — 25000003 PHARM REV CODE 250

## 2025-01-10 PROCEDURE — 3E033VJ INTRODUCTION OF OTHER HORMONE INTO PERIPHERAL VEIN, PERCUTANEOUS APPROACH: ICD-10-PCS | Performed by: STUDENT IN AN ORGANIZED HEALTH CARE EDUCATION/TRAINING PROGRAM

## 2025-01-10 PROCEDURE — 72200005 HC VAGINAL DELIVERY LEVEL II

## 2025-01-10 PROCEDURE — 63600175 PHARM REV CODE 636 W HCPCS

## 2025-01-10 PROCEDURE — 0KQM0ZZ REPAIR PERINEUM MUSCLE, OPEN APPROACH: ICD-10-PCS | Performed by: STUDENT IN AN ORGANIZED HEALTH CARE EDUCATION/TRAINING PROGRAM

## 2025-01-10 PROCEDURE — 72100003 HC LABOR CARE, EA. ADDL. 8 HRS

## 2025-01-10 PROCEDURE — 59400 OBSTETRICAL CARE: CPT | Mod: GB,,, | Performed by: STUDENT IN AN ORGANIZED HEALTH CARE EDUCATION/TRAINING PROGRAM

## 2025-01-10 PROCEDURE — 11000001 HC ACUTE MED/SURG PRIVATE ROOM

## 2025-01-10 PROCEDURE — 0502F SUBSEQUENT PRENATAL CARE: CPT | Mod: ,,, | Performed by: OBSTETRICS & GYNECOLOGY

## 2025-01-10 RX ORDER — OXYTOCIN 10 [USP'U]/ML
10 INJECTION, SOLUTION INTRAMUSCULAR; INTRAVENOUS ONCE AS NEEDED
Status: DISCONTINUED | OUTPATIENT
Start: 2025-01-10 | End: 2025-01-10

## 2025-01-10 RX ORDER — DOCUSATE SODIUM 100 MG/1
200 CAPSULE, LIQUID FILLED ORAL 2 TIMES DAILY PRN
Status: DISCONTINUED | OUTPATIENT
Start: 2025-01-10 | End: 2025-01-11 | Stop reason: HOSPADM

## 2025-01-10 RX ORDER — METHYLERGONOVINE MALEATE 0.2 MG/ML
INJECTION INTRAVENOUS
Status: COMPLETED
Start: 2025-01-10 | End: 2025-01-10

## 2025-01-10 RX ORDER — MISOPROSTOL 200 UG/1
800 TABLET ORAL ONCE AS NEEDED
Status: DISCONTINUED | OUTPATIENT
Start: 2025-01-10 | End: 2025-01-11 | Stop reason: HOSPADM

## 2025-01-10 RX ORDER — ACETAMINOPHEN 325 MG/1
650 TABLET ORAL EVERY 6 HOURS PRN
Status: DISCONTINUED | OUTPATIENT
Start: 2025-01-10 | End: 2025-01-11 | Stop reason: HOSPADM

## 2025-01-10 RX ORDER — METHYLERGONOVINE MALEATE 0.2 MG/1
0.2 TABLET ORAL EVERY 6 HOURS
Status: COMPLETED | OUTPATIENT
Start: 2025-01-10 | End: 2025-01-11

## 2025-01-10 RX ORDER — DIPHENOXYLATE HYDROCHLORIDE AND ATROPINE SULFATE 2.5; .025 MG/1; MG/1
2 TABLET ORAL EVERY 6 HOURS PRN
Status: DISCONTINUED | OUTPATIENT
Start: 2025-01-10 | End: 2025-01-11 | Stop reason: HOSPADM

## 2025-01-10 RX ORDER — MISOPROSTOL 200 UG/1
TABLET ORAL
Status: COMPLETED
Start: 2025-01-10 | End: 2025-01-10

## 2025-01-10 RX ORDER — OXYTOCIN-SODIUM CHLORIDE 0.9% IV SOLN 30 UNIT/500ML 30-0.9/5 UT/ML-%
95 SOLUTION INTRAVENOUS CONTINUOUS PRN
Status: DISCONTINUED | OUTPATIENT
Start: 2025-01-10 | End: 2025-01-10

## 2025-01-10 RX ORDER — HYDROCORTISONE 25 MG/G
CREAM TOPICAL 3 TIMES DAILY PRN
Status: DISCONTINUED | OUTPATIENT
Start: 2025-01-10 | End: 2025-01-11 | Stop reason: HOSPADM

## 2025-01-10 RX ORDER — DIPHENHYDRAMINE HYDROCHLORIDE 50 MG/ML
25 INJECTION INTRAMUSCULAR; INTRAVENOUS EVERY 4 HOURS PRN
Status: DISCONTINUED | OUTPATIENT
Start: 2025-01-10 | End: 2025-01-11 | Stop reason: HOSPADM

## 2025-01-10 RX ORDER — OXYTOCIN-SODIUM CHLORIDE 0.9% IV SOLN 30 UNIT/500ML 30-0.9/5 UT/ML-%
10 SOLUTION INTRAVENOUS ONCE AS NEEDED
Status: DISCONTINUED | OUTPATIENT
Start: 2025-01-10 | End: 2025-01-10

## 2025-01-10 RX ORDER — ONDANSETRON 8 MG/1
8 TABLET, ORALLY DISINTEGRATING ORAL EVERY 8 HOURS PRN
Status: DISCONTINUED | OUTPATIENT
Start: 2025-01-10 | End: 2025-01-11 | Stop reason: HOSPADM

## 2025-01-10 RX ORDER — MISOPROSTOL 200 UG/1
800 TABLET ORAL ONCE AS NEEDED
Status: COMPLETED | OUTPATIENT
Start: 2025-01-10 | End: 2025-01-10

## 2025-01-10 RX ORDER — METHYLERGONOVINE MALEATE 0.2 MG/ML
200 INJECTION INTRAVENOUS ONCE AS NEEDED
Status: COMPLETED | OUTPATIENT
Start: 2025-01-10 | End: 2025-01-10

## 2025-01-10 RX ORDER — CARBOPROST TROMETHAMINE 250 UG/ML
250 INJECTION, SOLUTION INTRAMUSCULAR
Status: DISCONTINUED | OUTPATIENT
Start: 2025-01-10 | End: 2025-01-11 | Stop reason: HOSPADM

## 2025-01-10 RX ORDER — IBUPROFEN 600 MG/1
600 TABLET ORAL EVERY 6 HOURS
Status: DISCONTINUED | OUTPATIENT
Start: 2025-01-10 | End: 2025-01-11 | Stop reason: HOSPADM

## 2025-01-10 RX ORDER — HYDROCODONE BITARTRATE AND ACETAMINOPHEN 10; 325 MG/1; MG/1
1 TABLET ORAL EVERY 4 HOURS PRN
Status: DISCONTINUED | OUTPATIENT
Start: 2025-01-10 | End: 2025-01-11 | Stop reason: HOSPADM

## 2025-01-10 RX ORDER — HYDROCODONE BITARTRATE AND ACETAMINOPHEN 5; 325 MG/1; MG/1
1 TABLET ORAL EVERY 4 HOURS PRN
Status: DISCONTINUED | OUTPATIENT
Start: 2025-01-10 | End: 2025-01-11 | Stop reason: HOSPADM

## 2025-01-10 RX ORDER — TRANEXAMIC ACID 10 MG/ML
1000 INJECTION, SOLUTION INTRAVENOUS EVERY 30 MIN PRN
Status: DISCONTINUED | OUTPATIENT
Start: 2025-01-10 | End: 2025-01-11 | Stop reason: HOSPADM

## 2025-01-10 RX ORDER — OXYTOCIN-SODIUM CHLORIDE 0.9% IV SOLN 30 UNIT/500ML 30-0.9/5 UT/ML-%
95 SOLUTION INTRAVENOUS ONCE AS NEEDED
Status: COMPLETED | OUTPATIENT
Start: 2025-01-10 | End: 2025-01-10

## 2025-01-10 RX ORDER — DIPHENHYDRAMINE HCL 25 MG
25 CAPSULE ORAL EVERY 4 HOURS PRN
Status: DISCONTINUED | OUTPATIENT
Start: 2025-01-10 | End: 2025-01-11 | Stop reason: HOSPADM

## 2025-01-10 RX ORDER — PRENATAL WITH FERROUS FUM AND FOLIC ACID 3080; 920; 120; 400; 22; 1.84; 3; 20; 10; 1; 12; 200; 27; 25; 2 [IU]/1; [IU]/1; MG/1; [IU]/1; MG/1; MG/1; MG/1; MG/1; MG/1; MG/1; UG/1; MG/1; MG/1; MG/1; MG/1
1 TABLET ORAL DAILY
Status: DISCONTINUED | OUTPATIENT
Start: 2025-01-10 | End: 2025-01-11 | Stop reason: HOSPADM

## 2025-01-10 RX ORDER — OXYTOCIN-SODIUM CHLORIDE 0.9% IV SOLN 30 UNIT/500ML 30-0.9/5 UT/ML-%
95 SOLUTION INTRAVENOUS ONCE AS NEEDED
Status: DISCONTINUED | OUTPATIENT
Start: 2025-01-10 | End: 2025-01-10

## 2025-01-10 RX ORDER — ACETAMINOPHEN 500 MG
1000 TABLET ORAL ONCE
Status: COMPLETED | OUTPATIENT
Start: 2025-01-10 | End: 2025-01-10

## 2025-01-10 RX ORDER — TRANEXAMIC ACID 10 MG/ML
1000 INJECTION, SOLUTION INTRAVENOUS EVERY 30 MIN PRN
Status: DISCONTINUED | OUTPATIENT
Start: 2025-01-10 | End: 2025-01-10

## 2025-01-10 RX ORDER — DIPHENOXYLATE HYDROCHLORIDE AND ATROPINE SULFATE 2.5; .025 MG/1; MG/1
2 TABLET ORAL EVERY 6 HOURS PRN
Status: DISCONTINUED | OUTPATIENT
Start: 2025-01-10 | End: 2025-01-10

## 2025-01-10 RX ORDER — SODIUM CHLORIDE 0.9 % (FLUSH) 0.9 %
10 SYRINGE (ML) INJECTION
Status: DISCONTINUED | OUTPATIENT
Start: 2025-01-10 | End: 2025-01-11 | Stop reason: HOSPADM

## 2025-01-10 RX ORDER — OXYTOCIN 10 [USP'U]/ML
10 INJECTION, SOLUTION INTRAMUSCULAR; INTRAVENOUS ONCE AS NEEDED
Status: DISCONTINUED | OUTPATIENT
Start: 2025-01-10 | End: 2025-01-11 | Stop reason: HOSPADM

## 2025-01-10 RX ORDER — SIMETHICONE 80 MG
1 TABLET,CHEWABLE ORAL EVERY 6 HOURS PRN
Status: DISCONTINUED | OUTPATIENT
Start: 2025-01-10 | End: 2025-01-11 | Stop reason: HOSPADM

## 2025-01-10 RX ORDER — OXYTOCIN-SODIUM CHLORIDE 0.9% IV SOLN 30 UNIT/500ML 30-0.9/5 UT/ML-%
10 SOLUTION INTRAVENOUS ONCE AS NEEDED
Status: DISCONTINUED | OUTPATIENT
Start: 2025-01-10 | End: 2025-01-11 | Stop reason: HOSPADM

## 2025-01-10 RX ORDER — FENTANYL/BUPIVACAINE/NS/PF 2MCG/ML-.1
PLASTIC BAG, INJECTION (ML) INJECTION
Status: DISPENSED
Start: 2025-01-10 | End: 2025-01-10

## 2025-01-10 RX ORDER — METHYLERGONOVINE MALEATE 0.2 MG/ML
200 INJECTION INTRAVENOUS ONCE AS NEEDED
Status: DISCONTINUED | OUTPATIENT
Start: 2025-01-10 | End: 2025-01-11 | Stop reason: HOSPADM

## 2025-01-10 RX ADMIN — SODIUM CHLORIDE 394.4 MG: 9 INJECTION, SOLUTION INTRAVENOUS at 05:01

## 2025-01-10 RX ADMIN — IBUPROFEN 600 MG: 600 TABLET, FILM COATED ORAL at 06:01

## 2025-01-10 RX ADMIN — MISOPROSTOL 800 MCG: 200 TABLET ORAL at 06:01

## 2025-01-10 RX ADMIN — VANCOMYCIN HYDROCHLORIDE 1500 MG: 1.5 INJECTION, POWDER, LYOPHILIZED, FOR SOLUTION INTRAVENOUS at 01:01

## 2025-01-10 RX ADMIN — METHYLERGONOVINE 0.2 MG: 0.2 TABLET ORAL at 11:01

## 2025-01-10 RX ADMIN — IBUPROFEN 600 MG: 600 TABLET, FILM COATED ORAL at 11:01

## 2025-01-10 RX ADMIN — METHYLERGONOVINE 0.2 MG: 0.2 TABLET ORAL at 06:01

## 2025-01-10 RX ADMIN — ACETAMINOPHEN 650 MG: 325 TABLET, FILM COATED ORAL at 06:01

## 2025-01-10 RX ADMIN — ACETAMINOPHEN 1000 MG: 500 TABLET, FILM COATED ORAL at 04:01

## 2025-01-10 RX ADMIN — OXYTOCIN 95 MILLI-UNITS/MIN: 10 INJECTION, SOLUTION INTRAMUSCULAR; INTRAVENOUS at 06:01

## 2025-01-10 RX ADMIN — METHYLERGONOVINE MALEATE 200 MCG: 0.2 INJECTION, SOLUTION INTRAMUSCULAR; INTRAVENOUS at 06:01

## 2025-01-10 RX ADMIN — HYDROCODONE BITARTRATE AND ACETAMINOPHEN 1 TABLET: 5; 325 TABLET ORAL at 07:01

## 2025-01-10 RX ADMIN — PRENATAL VIT W/ FE FUMARATE-FA TAB 27-0.8 MG 1 TABLET: 27-0.8 TAB at 11:01

## 2025-01-10 RX ADMIN — ONDANSETRON 8 MG: 8 TABLET, ORALLY DISINTEGRATING ORAL at 06:01

## 2025-01-10 RX ADMIN — METHYLERGONOVINE MALEATE 200 MCG: 0.2 INJECTION INTRAVENOUS at 06:01

## 2025-01-10 RX ADMIN — ACETAMINOPHEN 650 MG: 325 TABLET, FILM COATED ORAL at 11:01

## 2025-01-10 NOTE — PROGRESS NOTES
"LABOR NOTE    S:  Complaints: No.  Epidural working:  yes    O: BP (!) 113/56   Pulse 106   Temp 98.3 °F (36.8 °C) (Axillary)   Resp 20   Ht 5' 7.99" (1.727 m)   Wt 101.3 kg (223 lb 5.2 oz)   LMP 2024 (Exact Date)   SpO2 100%   Breastfeeding No   BMI 33.96 kg/m²     FHT:  Cat 1 (reassuring) 150bpm, moderate variability, + accels, no decels  CTX: q 2-3 min  SVE: /-2    TIMELINE:  0515: 1.5/-3, Ragsdale placed, Pitocin initiated  0915: 3/80/-3, pit @ 20  1115: Ragsdale out, patient desires epidural prior to AROM  1230: /-2, AROM meconium, pit @ 20  1715: /-2, pit @ 28, IUPC place  2230: /-2, pit @32    ASSESSMENT:   29 y.o.  at 39w6d, IOL  FHT reassuring    PLAN:    Continue Close Maternal/Fetal Monitoring  Pitocin Augmentation per protocol  Recheck 2-4 hours or PRN      Savana Burkett MD  Obstetrics & Gynecology, PGY-1    "

## 2025-01-10 NOTE — CARE UPDATE
Resident to bedside for recurrent variable-late decels    SVE 5/90/-2  NST baseline 150, mod BTBV, + accels, + recurrent variable-late decels  Agar: contractons q 2-3min    Plan:   FHR improved with maternal repositioning  If decelerations persist, will decrease pitocin     Jessica Nair MD  OB/GYN PGY-3

## 2025-01-10 NOTE — L&D DELIVERY NOTE
[facility] - Labor & Delivery  Vaginal Delivery   Operative Note    SUMMARY     Normal spontaneous vaginal delivery of live infant, was placed on mothers abdomen for skin to skin and bulb suctioning performed.  Infant delivered position OA over intact perineum.  Nuchal cord: No.    Spontaneous delivery of placenta and IV pitocin given noting uterine atony with bleeding. IM methergine and [place] cytotec given with improvement in uterine tone and bleeding.   2nd degree laceration noted and repaired in normal fashion with 2-0 vicryl .  Patient tolerated delivery well. Sponge needle and lap counted correctly x2.    QBL 400cc    Savana Burkett MD  Obstetrics & Gynecology, PGY-1        Indications: Encounter for induction of labor  Pregnancy complicated by:   Patient Active Problem List   Diagnosis    Benign neoplasm of scalp and skin of neck    Benign neoplasm of skin of other and unspecified parts of face    Amenorrhea    Maternal varicella, non-immune     Admitting GA: 40w0d    Delivery Information for Nigel Carrillo    Birth information:  YOB: 2025   Time of birth: 6:22 AM   Sex: male   Head Delivery Date/Time: 1/10/2025  6:22 AM   Delivery type: Vaginal, Spontaneous   Gestational Age: 40w0d       Delivery Providers    Delivering clinician: Dulce Maria Chase MD   Provider Role    Savana Burkett MD Conway, Angela, MD Cotros, Grace, Sera Hernadez RN Hilkirk, Jennifer, RN               Measurements    Weight: 4050 g  Weight (lbs): 8 lb 14.9 oz  Length:          Apgars    Living status: Living  Apgar Component Scores:  1 min.:  5 min.:  10 min.:  15 min.:  20 min.:    Skin color:  0  1       Heart rate:  2  2       Reflex irritability:  2  2       Muscle tone:  2  2       Respiratory effort:  2  2       Total:  8  9                Operative Delivery    Forceps attempted?: No  Vacuum extractor attempted?: No         Shoulder Dystocia    Shoulder dystocia present?: No           Presentation     Presentation: Vertex  Position: Middle Occiput Anterior           Interventions/Resuscitation    Method: NICU Attended       Cord    Vessels: 3 vessels  Complications: None  Delayed Cord Clamping?: Yes  Cord Clamped Date/Time: 1/10/2025  6:23 AM  Cord Blood Disposition: Sent with Baby  Gases Sent?: No  Stem Cell Collection (by MD): No       Placenta    Placenta delivery date/time: 1/10/2025 0624  Placenta removal: Expressed  Placenta appearance: Intact  Placenta disposition: Discarded           Labor Events:       labor: No     Labor Onset Date/Time:         Dilation Complete Date/Time:         Start Pushing Date/Time:         Start Pushing Date/Time:       Rupture Date/Time: 25 1225        Rupture type: ARM (Artificial Rupture)        Fluid Amount:       Fluid Color: Meconium Thin              steroids: None     Antibiotics given for GBS: Yes     Induction: oxytocin     Indications for induction:  Elective     Augmentation:       Indications for augmentation:       Labor complications: None     Additional complications:          Cervical ripening:                     Delivery:      Episiotomy: None     Indication for Episiotomy:       Perineal Lacerations:   Repaired:      Periurethral Laceration:   Repaired:     Labial Laceration:   Repaired:     Sulcus Laceration:   Repaired:     Vaginal Laceration:   Repaired:     Cervical Laceration:   Repaired:     Repair suture:       Repair # of packets: 2     Last Value - EBL - Nursing (mL):       Sum - EBL - Nursing (mL): 0     Last Value - EBL - Anesthesia (mL):      Calculated QBL (mL):       Running total QBL (mL):       Vaginal Sweep Performed: Yes     Surgicount Correct: Yes     Vaginal Packing: Yes Quantity:       Other providers:       Anesthesia    Method: Spinal, Epidural          Details (if applicable):  Trial of Labor      Categorization:      Priority:     Indications for :     Incision Type:        Additional  information:  Forceps:    Vacuum:    Breech:    Observed anomalies    Other (Comments):

## 2025-01-10 NOTE — PROGRESS NOTES
"LABOR NOTE    S:  Complaints: No.  Epidural working:  yes    O: /65   Pulse 99   Temp 99 °F (37.2 °C) (Axillary)   Resp 16   Ht 5' 7.99" (1.727 m)   Wt 101.3 kg (223 lb 5.2 oz)   LMP 2024 (Exact Date)   SpO2 98%   Breastfeeding No   BMI 33.96 kg/m²     FHT:  Cat 1 (reassuring) 145bpm, moderate variability, + accels, no decels  CTX: q 2-3 min  SVE: 10/100/0    TIMELINE:  0515: 1.5/-3, Ragsdale placed, Pitocin initiated  0915: 3/80/-3, pit @ 20  1115: Ragsdale out, patient desires epidural prior to AROM  1230: /-2, AROM meconium, pit @ 20  1715: /-2, pit @ 28, IUPC place  2230: /-2, pit @32, pitocin paused  2300: pitocin restarted  0300: 10/100/0, pit @ 28    ASSESSMENT:   29 y.o.  at 39w6d, IOL  FHT reassuring    PLAN:  Notify staff, anticipate     Jessica Nair MD  OB/GYN PGY-3     "

## 2025-01-10 NOTE — PLAN OF CARE
VSS. Pain controlled with scheduled oral pain medication. Fundus firm and midline with light lochia rubra. Methergine series in process. Voiding spontaneously with adequate output. No concerns at this time.

## 2025-01-11 ENCOUNTER — LACTATION ENCOUNTER (OUTPATIENT)
Dept: INTENSIVE CARE | Facility: OTHER | Age: 30
End: 2025-01-11

## 2025-01-11 PROBLEM — R03.0 ELEVATED BLOOD PRESSURE READING: Status: ACTIVE | Noted: 2025-01-11

## 2025-01-11 LAB
BASOPHILS # BLD AUTO: 0.05 K/UL (ref 0–0.2)
BASOPHILS NFR BLD: 0.4 % (ref 0–1.9)
DIFFERENTIAL METHOD BLD: ABNORMAL
EOSINOPHIL # BLD AUTO: 0.2 K/UL (ref 0–0.5)
EOSINOPHIL NFR BLD: 1.1 % (ref 0–8)
ERYTHROCYTE [DISTWIDTH] IN BLOOD BY AUTOMATED COUNT: 15.1 % (ref 11.5–14.5)
HCT VFR BLD AUTO: 31.2 % (ref 37–48.5)
HGB BLD-MCNC: 10.5 G/DL (ref 12–16)
IMM GRANULOCYTES # BLD AUTO: 0.07 K/UL (ref 0–0.04)
IMM GRANULOCYTES NFR BLD AUTO: 0.5 % (ref 0–0.5)
LYMPHOCYTES # BLD AUTO: 2.2 K/UL (ref 1–4.8)
LYMPHOCYTES NFR BLD: 15.9 % (ref 18–48)
MCH RBC QN AUTO: 28.5 PG (ref 27–31)
MCHC RBC AUTO-ENTMCNC: 33.7 G/DL (ref 32–36)
MCV RBC AUTO: 85 FL (ref 82–98)
MONOCYTES # BLD AUTO: 1 K/UL (ref 0.3–1)
MONOCYTES NFR BLD: 6.8 % (ref 4–15)
NEUTROPHILS # BLD AUTO: 10.6 K/UL (ref 1.8–7.7)
NEUTROPHILS NFR BLD: 75.3 % (ref 38–73)
NRBC BLD-RTO: 0 /100 WBC
PLATELET # BLD AUTO: 224 K/UL (ref 150–450)
PMV BLD AUTO: 11 FL (ref 9.2–12.9)
RBC # BLD AUTO: 3.69 M/UL (ref 4–5.4)
WBC # BLD AUTO: 14.03 K/UL (ref 3.9–12.7)

## 2025-01-11 PROCEDURE — 25000003 PHARM REV CODE 250

## 2025-01-11 PROCEDURE — 85025 COMPLETE CBC W/AUTO DIFF WBC: CPT

## 2025-01-11 PROCEDURE — 36415 COLL VENOUS BLD VENIPUNCTURE: CPT

## 2025-01-11 RX ORDER — IBUPROFEN 600 MG/1
600 TABLET ORAL EVERY 6 HOURS PRN
Qty: 30 TABLET | Refills: 0 | Status: SHIPPED | OUTPATIENT
Start: 2025-01-11

## 2025-01-11 RX ADMIN — IBUPROFEN 600 MG: 600 TABLET, FILM COATED ORAL at 05:01

## 2025-01-11 RX ADMIN — DOCUSATE SODIUM 200 MG: 100 CAPSULE, LIQUID FILLED ORAL at 08:01

## 2025-01-11 RX ADMIN — IBUPROFEN 600 MG: 600 TABLET, FILM COATED ORAL at 11:01

## 2025-01-11 RX ADMIN — PRENATAL VIT W/ FE FUMARATE-FA TAB 27-0.8 MG 1 TABLET: 27-0.8 TAB at 08:01

## 2025-01-11 RX ADMIN — METHYLERGONOVINE 0.2 MG: 0.2 TABLET ORAL at 05:01

## 2025-01-11 RX ADMIN — ACETAMINOPHEN 650 MG: 325 TABLET, FILM COATED ORAL at 11:01

## 2025-01-11 RX ADMIN — ACETAMINOPHEN 650 MG: 325 TABLET, FILM COATED ORAL at 05:01

## 2025-01-11 NOTE — SUBJECTIVE & OBJECTIVE
Interval History: PPD1    She is doing well this morning. She is tolerating a regular diet without nausea or vomiting. She is voiding spontaneously. She is ambulating. She has passed flatus, and has a BM. Vaginal bleeding is mild. She denies fever or chills. Abdominal pain is mild and controlled with oral medications. She Is not breastfeeding. She desires circumcision for her male baby: not applicable (NICU).    Objective:     Vital Signs (Most Recent):  Temp: 98.7 °F (37.1 °C) (01/11/25 0900)  Pulse: 86 (01/11/25 0900)  Resp: 18 (01/11/25 0900)  BP: 126/70 (01/11/25 0900)  SpO2: 98 % (01/11/25 0900) Vital Signs (24h Range):  Temp:  [97.6 °F (36.4 °C)-98.7 °F (37.1 °C)] 98.7 °F (37.1 °C)  Pulse:  [] 86  Resp:  [17-18] 18  SpO2:  [98 %-100 %] 98 %  BP: (114-133)/(66-73) 126/70     Weight: 101.3 kg (223 lb 5.2 oz)  Body mass index is 33.96 kg/m².      Intake/Output Summary (Last 24 hours) at 1/11/2025 1023  Last data filed at 1/10/2025 1635  Gross per 24 hour   Intake --   Output 850 ml   Net -850 ml         Significant Labs:  Lab Results   Component Value Date    GROUPTRH O POS 01/09/2025    HEPBSAG Non-reactive 05/23/2024     Recent Labs   Lab 01/11/25  0620   HGB 10.5*   HCT 31.2*       I have personallly reviewed all pertinent lab results from the last 24 hours.    Physical Exam:   Constitutional: She is oriented to person, place, and time. She appears well-developed and well-nourished.    HENT:   Head: Normocephalic and atraumatic.    Eyes: Conjunctivae are normal.     Cardiovascular:  Normal rate.             Pulmonary/Chest: Effort normal.        Abdominal: Soft. There is no abdominal tenderness.     Genitourinary: There is vaginal discharge (lochia) in the vagina.           Musculoskeletal: Normal range of motion.       Neurological: She is alert and oriented to person, place, and time.    Skin: Skin is warm and dry.    Psychiatric: She has a normal mood and affect. Her behavior is normal. Judgment and  thought content normal.       Review of Systems   Constitutional:  Negative for chills and fever.   Eyes: Negative.  Negative for visual disturbance.   Respiratory: Negative.     Cardiovascular: Negative.    Gastrointestinal:  Negative for abdominal pain, nausea and vomiting.   Endocrine: Negative.    Genitourinary:  Positive for vaginal bleeding (lochia) and vaginal pain (at laceration site). Negative for vaginal odor.   Musculoskeletal: Negative.    Integumentary:  Negative.   Neurological: Negative.  Negative for headaches.   Hematological: Negative.    Psychiatric/Behavioral: Negative.     Breast: negative.

## 2025-01-11 NOTE — DISCHARGE SUMMARY
[facility] ([place])  Obstetrics  Discharge Summary      Patient Name: Iqra Carrillo  MRN: [id number]  Admission Date: 2025  Hospital Length of Stay: 2 days  Discharge Date and Time:  2025 10:27 AM  Attending Physician: Jessica Hamilton MD   Discharging Provider: Janelle Lemons CNM   Primary Care Provider: Iqra Lafleur MD    HPI: No notes on file        * No surgery found *     Hospital Course:   25 PPD1 - Doing well today, normal lochia, pain controlled with PO meds. Formula feeding. Denies s/s of pre-E. Desires discharge home today.          Final Active Diagnoses:    Diagnosis Date Noted POA    PRINCIPAL PROBLEM:   (normal spontaneous vaginal delivery) [O80] 2025 Not Applicable    Elevated blood pressure reading [R03.0] 2025 No    Maternal varicella, non-immune [O09.899, Z28.39] 2025 Yes      Problems Resolved During this Admission:    Diagnosis Date Noted Date Resolved POA    Encounter for induction of labor [Z34.90] 2025 01/10/2025 Not Applicable    GBS (group B streptococcus) infection [A49.1] 2025 01/10/2025 Yes    History of penicillin allergy [Z88.0] 2025 01/10/2025 Not Applicable    Genital herpes [A60.00] 2024 01/10/2025 Yes        Significant Diagnostic Studies: Labs: CBC   Recent Labs   Lab 25  0620   WBC 14.03*   HGB 10.5*   HCT 31.2*            Feeding Method: bottle    Immunizations       Date Immunization Status Dose Route/Site Given by    01/10/25 1049 MMR Incomplete 0.5 mL Subcutaneous/     01/10/25 1049 Tdap Incomplete 0.5 mL Intramuscular/             Delivery:    Episiotomy: None   Lacerations: 2nd   Repair suture:     Repair # of packets: 2   Blood loss (ml):       Birth information:  YOB: 2025   Time of birth: 6:22 AM   Sex: male   Delivery type: Vaginal, Spontaneous   Gestational Age: 40w0d     Measurements    Weight: 4050 g  Weight (lbs): 8 lb 14.9 oz  Length: 52.1 cm  Length  "(in): 20.5"  Head circumference: 37.7 cm  Chest circumference: 37 cm         Delivery Clinician: Delivery Providers    Delivering clinician: Dulce Maria Chase MD   Provider Role    Savana Burkett MD Conway, Angela, MD Cotros, Grace, Sera Hernadez, Maria Del Carmen Guerra RN              Additional  information:  Forceps:    Vacuum:    Breech:    Observed anomalies      Living?:     Apgars    Living status: Living  Apgar Component Scores:  1 min.:  5 min.:  10 min.:  15 min.:  20 min.:    Skin color:  0  1       Heart rate:  2  2       Reflex irritability:  2  2       Muscle tone:  2  2       Respiratory effort:  2  2       Total:  8  9                Placenta: Delivered:       appearance  Pending Diagnostic Studies:       None            Discharged Condition: stable    Disposition: Home or Self Care    Follow Up:   Follow-up Information       Jessica Hamilton MD. Schedule an appointment as soon as possible for a visit in 6 week(s).    Specialties: Obstetrics, Obstetrics and Gynecology  Why: PP appointment  Contact information:  [address] [id number] [phone]               Jessica Hamilton MD. Schedule an appointment as soon as possible for a visit in 1 week(s).    Specialties: Obstetrics, Obstetrics and Gynecology  Why: BP Check - can do with home cuff  Contact information:  [address] [id number] [phone]                           Patient Instructions:      Diet Adult Regular     Pelvic Rest     Notify your health care provider if you experience any of the following:  temperature >100.4     Notify your health care provider if you experience any of the following:  persistent nausea and vomiting or diarrhea     Notify your health care provider if you experience any of the following:  severe uncontrolled pain     Notify your health care provider if you experience any of the following:  redness, tenderness, or signs of infection (pain, " swelling, redness, odor or green/yellow discharge around incision site)     Notify your health care provider if you experience any of the following:  difficulty breathing or increased cough     Notify your health care provider if you experience any of the following:  severe persistent headache     Notify your health care provider if you experience any of the following:  worsening rash     Notify your health care provider if you experience any of the following:  persistent dizziness, light-headedness, or visual disturbances     Notify your health care provider if you experience any of the following:  increased confusion or weakness     Activity as tolerated     Medications:  Current Discharge Medication List        START taking these medications    Details   ibuprofen (ADVIL,MOTRIN) 600 MG tablet Take 1 tablet (600 mg total) by mouth every 6 (six) hours as needed for Pain.  Qty: 30 tablet, Refills: 0           CONTINUE these medications which have NOT CHANGED    Details   ciprofloxacin-dexAMETHasone 0.3-0.1% (CIPRODEX) 0.3-0.1 % DrpS       prenatal vit37/iron/folic acid (PRENATA ORAL) Take by mouth.           STOP taking these medications       aspirin (ECOTRIN) 81 MG EC tablet Comments:   Reason for Stopping:         valACYclovir (VALTREX) 500 MG tablet Comments:   Reason for Stopping:               Janelle Lemons CNM  Obstetrics  [facility] ([place])

## 2025-01-11 NOTE — LACTATION NOTE
This note was copied from a baby's chart.  Benefits of mother's own milk discussed with mother. Mother encouraged to provide human milk for her baby to promote growth and development and to reduce the risk of illness and disease. Mom declines at this time. Education and handouts provided on hand expression (to provide colostrum to baby-as mom may desire to do this), non-nursing engorgement and benefits of breast milk. Mom encouraged to call lactation or let her nurse know if she changes her mind.

## 2025-01-11 NOTE — PLAN OF CARE
Problem:  Fall Injury Risk  Goal: Absence of Fall, Infant Drop and Related Injury  Outcome: Met     Problem: Labor  Goal: Hemostasis  Outcome: Met  Goal: Stable Fetal Wellbeing  Outcome: Met  Goal: Effective Progression to Delivery  Outcome: Met  Goal: Absence of Infection Signs and Symptoms  Outcome: Met  Goal: Acceptable Pain Control  Outcome: Met  Goal: Normal Uterine Contraction Pattern  Outcome: Met     Problem: Adult Inpatient Plan of Care  Goal: Plan of Care Review  Outcome: Met  Goal: Patient-Specific Goal (Individualized)  Outcome: Met  Goal: Absence of Hospital-Acquired Illness or Injury  Outcome: Met  Goal: Optimal Comfort and Wellbeing  Outcome: Met  Goal: Readiness for Transition of Care  Outcome: Met     Problem: Infection  Goal: Absence of Infection Signs and Symptoms  Outcome: Met     Problem: Pain Acute  Goal: Optimal Pain Control and Function  Outcome: Met     Problem: Fatigue  Goal: Improved Activity Tolerance  Outcome: Met     Problem: Anesthesia/Analgesia, Neuraxial  Goal: Safe, Effective Infusion Delivery  Outcome: Met  Goal: Stable Patient-Fetal Status  Outcome: Met  Goal: Absence of Infection Signs and Symptoms  Outcome: Met  Goal: Nausea and Vomiting Relief  Outcome: Met  Goal: Effective Pain Control  Outcome: Met  Goal: Effective Oxygenation and Ventilation  Outcome: Met  Goal: Baseline Motor Function Return  Outcome: Met  Goal: Effective Urinary Elimination  Outcome: Met     Problem: Skin Injury Risk Increased  Goal: Skin Health and Integrity  Outcome: Met     Problem: Postpartum (Vaginal Delivery)  Goal: Successful Parent Role Transition  Outcome: Met  Goal: Hemostasis  Outcome: Met  Goal: Absence of Infection Signs and Symptoms  Outcome: Met  Goal: Anesthesia/Sedation Recovery  Outcome: Met  Goal: Optimal Pain Control and Function  Outcome: Met  Goal: Effective Urinary Elimination  Outcome: Met

## 2025-01-11 NOTE — ASSESSMENT & PLAN NOTE
1/11/25 PPD1 - Doing well today, normal lochia, pain controlled with PO meds. Formula feeding. Denies s/s of pre-E. Desires discharge home today.

## 2025-01-11 NOTE — PROGRESS NOTES
[facility] ([place])  Obstetrics  Postpartum Progress Note    Patient Name: Iqra Carrillo  MRN: [id number]  Admission Date: 2025  Hospital Length of Stay: 2 days  Attending Physician: Jessica Hamilton MD  Primary Care Provider: Iqra Lafleur MD    Subjective:     Principal Problem: (normal spontaneous vaginal delivery)    Hospital Course:  25 PPD1 - Doing well today, normal lochia, pain controlled with PO meds. Formula feeding. Denies s/s of pre-E. Desires discharge home today.     Interval History: PPD1    She is doing well this morning. She is tolerating a regular diet without nausea or vomiting. She is voiding spontaneously. She is ambulating. She has passed flatus, and has a BM. Vaginal bleeding is mild. She denies fever or chills. Abdominal pain is mild and controlled with oral medications. She Is not breastfeeding. She desires circumcision for her male baby: not applicable (NICU).    Objective:     Vital Signs (Most Recent):  Temp: 98.7 °F (37.1 °C) (25 09)  Pulse: 86 (25 09)  Resp: 18 (25)  BP: 126/70 (25)  SpO2: 98 % (25) Vital Signs (24h Range):  Temp:  [97.6 °F (36.4 °C)-98.7 °F (37.1 °C)] 98.7 °F (37.1 °C)  Pulse:  [] 86  Resp:  [17-18] 18  SpO2:  [98 %-100 %] 98 %  BP: (114-133)/(66-73) 126/70     Weight: 101.3 kg (223 lb 5.2 oz)  Body mass index is 33.96 kg/m².      Intake/Output Summary (Last 24 hours) at 2025 1023  Last data filed at 1/10/2025 1635  Gross per 24 hour   Intake --   Output 850 ml   Net -850 ml         Significant Labs:  Lab Results   Component Value Date    GROUPTRH O POS 2025    HEPBSAG Non-reactive 2024     Recent Labs   Lab 25  0620   HGB 10.5*   HCT 31.2*       I have personallly reviewed all pertinent lab results from the last 24 hours.    Physical Exam:   Constitutional: She is oriented to person, place, and time. She appears well-developed and well-nourished.    HENT:    Head: Normocephalic and atraumatic.    Eyes: Conjunctivae are normal.     Cardiovascular:  Normal rate.             Pulmonary/Chest: Effort normal.        Abdominal: Soft. There is no abdominal tenderness.     Genitourinary: There is vaginal discharge (lochia) in the vagina.           Musculoskeletal: Normal range of motion.       Neurological: She is alert and oriented to person, place, and time.    Skin: Skin is warm and dry.    Psychiatric: She has a normal mood and affect. Her behavior is normal. Judgment and thought content normal.       Review of Systems   Constitutional:  Negative for chills and fever.   Eyes: Negative.  Negative for visual disturbance.   Respiratory: Negative.     Cardiovascular: Negative.    Gastrointestinal:  Negative for abdominal pain, nausea and vomiting.   Endocrine: Negative.    Genitourinary:  Positive for vaginal bleeding (lochia) and vaginal pain (at laceration site). Negative for vaginal odor.   Musculoskeletal: Negative.    Integumentary:  Negative.   Neurological: Negative.  Negative for headaches.   Hematological: Negative.    Psychiatric/Behavioral: Negative.     Breast: negative.      Assessment/Plan:     29 y.o. female  for:    *  (normal spontaneous vaginal delivery)  25 PPD1 - Doing well today, normal lochia, pain controlled with PO meds. Formula feeding. Denies s/s of pre-E. Desires discharge home today.     Elevated blood pressure reading  Denies s/s of pre-E  One week BP check (can do with home cuff)          Disposition: As patient meets milestones, will plan to discharge today.    Janelle Lemons CNM  Obstetrics  [facility] ([place]

## 2025-01-12 VITALS
TEMPERATURE: 99 F | HEART RATE: 86 BPM | OXYGEN SATURATION: 98 % | BODY MASS INDEX: 33.84 KG/M2 | SYSTOLIC BLOOD PRESSURE: 126 MMHG | DIASTOLIC BLOOD PRESSURE: 70 MMHG | RESPIRATION RATE: 18 BRPM | HEIGHT: 68 IN | WEIGHT: 223.31 LBS

## 2025-01-13 ENCOUNTER — PATIENT MESSAGE (OUTPATIENT)
Dept: OBSTETRICS AND GYNECOLOGY | Facility: OTHER | Age: 30
End: 2025-01-13
Payer: COMMERCIAL

## 2025-01-13 NOTE — ANESTHESIA POSTPROCEDURE EVALUATION
Anesthesia Post Evaluation    Patient: Iqra Carrillo    Procedure(s) Performed: * No procedures listed *    Final Anesthesia Type: epidural      Patient location during evaluation: labor & delivery  Patient participation: Yes- Able to Participate  Level of consciousness: awake and alert  Post-procedure vital signs: reviewed and stable  Pain management: adequate  Airway patency: patent    PONV status at discharge: No PONV  Anesthetic complications: no      Cardiovascular status: hemodynamically stable  Respiratory status: unassisted, spontaneous ventilation and room air  Hydration status: euvolemic  Follow-up not needed.          Vitals Value Taken Time   /70 01/11/25 0900   Temp 37.1 °C (98.7 °F) 01/11/25 0900   Pulse 86 01/11/25 0900   Resp 18 01/11/25 0900   SpO2 98 % 01/11/25 0900         No case tracking events are documented in the log.      Pain/Aneta Score: No data recorded

## 2025-01-19 ENCOUNTER — PATIENT MESSAGE (OUTPATIENT)
Dept: OBSTETRICS AND GYNECOLOGY | Facility: CLINIC | Age: 30
End: 2025-01-19
Payer: COMMERCIAL

## 2025-01-31 ENCOUNTER — PATIENT MESSAGE (OUTPATIENT)
Dept: OBSTETRICS AND GYNECOLOGY | Facility: CLINIC | Age: 30
End: 2025-01-31
Payer: COMMERCIAL

## 2025-02-18 ENCOUNTER — POSTPARTUM VISIT (OUTPATIENT)
Dept: OBSTETRICS AND GYNECOLOGY | Facility: CLINIC | Age: 30
End: 2025-02-18
Payer: COMMERCIAL

## 2025-02-18 VITALS
SYSTOLIC BLOOD PRESSURE: 118 MMHG | BODY MASS INDEX: 30.1 KG/M2 | DIASTOLIC BLOOD PRESSURE: 80 MMHG | HEIGHT: 68 IN | WEIGHT: 198.63 LBS

## 2025-02-18 NOTE — PROGRESS NOTES
Postpartum Visit  Iqra Carrillo is a 29 y.o. female  is here for a postpartum visit. She is 6 weeks postpartum following a spontaneous vaginal delivery, of a male (GILDA) infant weighinlb 15oz, with Anesthesia: epidural. . The delivery was at 40w 0d.     Pregnancy was complicated by:  h/o PCOS, HSV, and GBS positive.      OB History    Para Term  AB Living   1 1 1 0 0 1   SAB IAB Ectopic Multiple Live Births   0 0 0 0 1      # Outcome Date GA Lbr Iska/2nd Weight Sex Type Anes PTL Lv   1 Term 01/10/25 40w0d  4.05 kg (8 lb 14.9 oz) M Vag-Spont Spinal, EPI N RENETTA       Postpartum course has been uncomplicated.  Bleeding no bleeding. Bowel/ bladder function is normal. Her last pap was  WNL.  Patient is not sexually active. Desired contraception method is none.   Postpartum depression screening: negative.  Baby's course has been complicated by 2 day stay in NICU due to hematoma. Baby is feeding by bottle     ROS:  GENERAL: No fever, chills, fatigability.  VULVAR: No pain, no lesions and no itching.  VAGINAL: No relaxation, no itching, no discharge, no abnormal bleeding and no lesions.  ABDOMEN: No abdominal pain. Denies nausea. Denies vomiting. No diarrhea. No constipation  BREAST: Denies pain. No lumps. No discharge.  URINARY: No incontinence, no nocturia, no frequency and no dysuria.  CARDIOVASCULAR: No chest pain. No shortness of breath. No leg cramps.  NEUROLOGICAL: No headaches. No vision changes.      General appearance - alert, well appearing, and in no distress  Mental status - alert, oriented to person, place, and time  Skin - coloration normal for race, good turgor, warm to touch, no rashes  Abdomen - soft, nontender, nondistended, no masses or organomegaly  Pelvic - External genitalia postpartum: normal, well-healed, without lesions or masses.  Normal female hair distribution. Adequate perineal body. Urethral meatus without lesions or prolapse. Urethra: no masses, tenderness,  or scarring.  Bladder: without tenderness or masses.  Vaginal mucosa moist and pink, normal rugae, without lesions, abnormal discharge, or foul odor.  Cervix pink, no lesions, no cervical motion tenderness.  Uterus: midline, non tender, smooth, not enlarged, not prolapsed  No adnexal masses or tenderness.  Extremities - no edema, redness or tenderness in the calves or thighs      Diagnoses and all orders for this visit:    Routine postpartum follow-up        Discussed contraception - pt desires none  Counseling regarding resuming normal activities of exercise and work.  Postpartum precautions reviewed    Routine follow up in 1 yr    Marianne Chadwick, YANELI

## 2025-03-21 NOTE — PROGRESS NOTES
1     The patient is Stable - Low risk of patient condition declining or worsening    Shift Goals  Clinical Goals: monitor respiratory status and labs, pain management, IV abx  Patient Goals: pain control, rest  Family Goals: Paib mgt, haines    Progress made toward(s) clinical / shift goals:  pt has remained hemodynamically stable. Pain improving 6/10.     Patient is not progressing towards the following goals: